# Patient Record
Sex: FEMALE | Race: WHITE | URBAN - METROPOLITAN AREA
[De-identification: names, ages, dates, MRNs, and addresses within clinical notes are randomized per-mention and may not be internally consistent; named-entity substitution may affect disease eponyms.]

---

## 2019-08-25 ENCOUNTER — EMERGENCY (EMERGENCY)
Facility: HOSPITAL | Age: 19
LOS: 1 days | Discharge: HOME | End: 2019-08-25
Attending: EMERGENCY MEDICINE
Payer: SELF-PAY

## 2019-08-25 VITALS
HEART RATE: 165 BPM | TEMPERATURE: 98 F | WEIGHT: 113.1 LBS | OXYGEN SATURATION: 100 % | SYSTOLIC BLOOD PRESSURE: 139 MMHG | DIASTOLIC BLOOD PRESSURE: 92 MMHG | RESPIRATION RATE: 22 BRPM | HEIGHT: 60 IN

## 2019-08-25 VITALS
DIASTOLIC BLOOD PRESSURE: 74 MMHG | SYSTOLIC BLOOD PRESSURE: 120 MMHG | RESPIRATION RATE: 18 BRPM | HEART RATE: 95 BPM | OXYGEN SATURATION: 98 % | TEMPERATURE: 98 F

## 2019-08-25 LAB
AMPHET UR-MCNC: NEGATIVE — SIGNIFICANT CHANGE UP
APPEARANCE UR: CLEAR — SIGNIFICANT CHANGE UP
APTT BLD: 28.1 SEC — SIGNIFICANT CHANGE UP (ref 27.5–36.3)
BARBITURATES UR SCN-MCNC: NEGATIVE — SIGNIFICANT CHANGE UP
BASOPHILS # BLD AUTO: 0.02 K/UL — SIGNIFICANT CHANGE UP (ref 0–0.2)
BASOPHILS NFR BLD AUTO: 0.3 % — SIGNIFICANT CHANGE UP (ref 0–2)
BENZODIAZ UR-MCNC: NEGATIVE — SIGNIFICANT CHANGE UP
BILIRUB UR-MCNC: NEGATIVE — SIGNIFICANT CHANGE UP
COCAINE METAB.OTHER UR-MCNC: NEGATIVE — SIGNIFICANT CHANGE UP
COLOR SPEC: YELLOW — SIGNIFICANT CHANGE UP
DIFF PNL FLD: NEGATIVE — SIGNIFICANT CHANGE UP
EOSINOPHIL # BLD AUTO: 0.02 K/UL — SIGNIFICANT CHANGE UP (ref 0–0.5)
EOSINOPHIL NFR BLD AUTO: 0.3 % — SIGNIFICANT CHANGE UP (ref 0–6)
EPI CELLS # UR: NEGATIVE — SIGNIFICANT CHANGE UP
GLUCOSE UR QL: NEGATIVE MG/DL — SIGNIFICANT CHANGE UP
HCT VFR BLD CALC: 43.5 % — SIGNIFICANT CHANGE UP (ref 34.5–45)
HGB BLD-MCNC: 14.5 G/DL — SIGNIFICANT CHANGE UP (ref 11.5–15.5)
IMM GRANULOCYTES NFR BLD AUTO: 0.4 % — SIGNIFICANT CHANGE UP (ref 0–1.5)
INR BLD: 0.93 RATIO — SIGNIFICANT CHANGE UP (ref 0.88–1.16)
KETONES UR-MCNC: ABNORMAL
LEUKOCYTE ESTERASE UR-ACNC: ABNORMAL
LYMPHOCYTES # BLD AUTO: 1.68 K/UL — SIGNIFICANT CHANGE UP (ref 1–3.3)
LYMPHOCYTES # BLD AUTO: 21.8 % — SIGNIFICANT CHANGE UP (ref 13–44)
MCHC RBC-ENTMCNC: 28.2 PG — SIGNIFICANT CHANGE UP (ref 27–34)
MCHC RBC-ENTMCNC: 33.3 GM/DL — SIGNIFICANT CHANGE UP (ref 32–36)
MCV RBC AUTO: 84.5 FL — SIGNIFICANT CHANGE UP (ref 80–100)
METHADONE UR-MCNC: NEGATIVE — SIGNIFICANT CHANGE UP
MONOCYTES # BLD AUTO: 0.41 K/UL — SIGNIFICANT CHANGE UP (ref 0–0.9)
MONOCYTES NFR BLD AUTO: 5.3 % — SIGNIFICANT CHANGE UP (ref 2–14)
NEUTROPHILS # BLD AUTO: 5.55 K/UL — SIGNIFICANT CHANGE UP (ref 1.8–7.4)
NEUTROPHILS NFR BLD AUTO: 71.9 % — SIGNIFICANT CHANGE UP (ref 43–77)
NITRITE UR-MCNC: NEGATIVE — SIGNIFICANT CHANGE UP
OPIATES UR-MCNC: NEGATIVE — SIGNIFICANT CHANGE UP
PCP SPEC-MCNC: SIGNIFICANT CHANGE UP
PCP UR-MCNC: NEGATIVE — SIGNIFICANT CHANGE UP
PH UR: 6 — SIGNIFICANT CHANGE UP (ref 5–8)
PLATELET # BLD AUTO: 288 K/UL — SIGNIFICANT CHANGE UP (ref 150–400)
PROT UR-MCNC: NEGATIVE MG/DL — SIGNIFICANT CHANGE UP
PROTHROM AB SERPL-ACNC: 10.7 SEC — SIGNIFICANT CHANGE UP (ref 10–12.9)
RBC # BLD: 5.15 M/UL — SIGNIFICANT CHANGE UP (ref 3.8–5.2)
RBC # FLD: 13.3 % — SIGNIFICANT CHANGE UP (ref 10.3–14.5)
RBC CASTS # UR COMP ASSIST: SIGNIFICANT CHANGE UP /HPF (ref 0–4)
SP GR SPEC: 1.01 — SIGNIFICANT CHANGE UP (ref 1.01–1.02)
THC UR QL: NEGATIVE — SIGNIFICANT CHANGE UP
UROBILINOGEN FLD QL: NEGATIVE MG/DL — SIGNIFICANT CHANGE UP
WBC # BLD: 7.71 K/UL — SIGNIFICANT CHANGE UP (ref 3.8–10.5)
WBC # FLD AUTO: 7.71 K/UL — SIGNIFICANT CHANGE UP (ref 3.8–10.5)
WBC UR QL: SIGNIFICANT CHANGE UP

## 2019-08-25 PROCEDURE — 72125 CT NECK SPINE W/O DYE: CPT | Mod: 26

## 2019-08-25 PROCEDURE — 99284 EMERGENCY DEPT VISIT MOD MDM: CPT

## 2019-08-25 PROCEDURE — 96375 TX/PRO/DX INJ NEW DRUG ADDON: CPT

## 2019-08-25 PROCEDURE — 99053 MED SERV 10PM-8AM 24 HR FAC: CPT

## 2019-08-25 PROCEDURE — 85027 COMPLETE CBC AUTOMATED: CPT

## 2019-08-25 PROCEDURE — 96361 HYDRATE IV INFUSION ADD-ON: CPT

## 2019-08-25 PROCEDURE — 96374 THER/PROPH/DIAG INJ IV PUSH: CPT

## 2019-08-25 PROCEDURE — 81001 URINALYSIS AUTO W/SCOPE: CPT

## 2019-08-25 PROCEDURE — 99284 EMERGENCY DEPT VISIT MOD MDM: CPT | Mod: 25

## 2019-08-25 PROCEDURE — 85730 THROMBOPLASTIN TIME PARTIAL: CPT

## 2019-08-25 PROCEDURE — 85610 PROTHROMBIN TIME: CPT

## 2019-08-25 PROCEDURE — 80307 DRUG TEST PRSMV CHEM ANLYZR: CPT

## 2019-08-25 PROCEDURE — 70450 CT HEAD/BRAIN W/O DYE: CPT | Mod: 26

## 2019-08-25 PROCEDURE — 36415 COLL VENOUS BLD VENIPUNCTURE: CPT

## 2019-08-25 PROCEDURE — 70450 CT HEAD/BRAIN W/O DYE: CPT

## 2019-08-25 PROCEDURE — 72125 CT NECK SPINE W/O DYE: CPT

## 2019-08-25 RX ORDER — KETOROLAC TROMETHAMINE 30 MG/ML
30 SYRINGE (ML) INJECTION ONCE
Refills: 0 | Status: DISCONTINUED | OUTPATIENT
Start: 2019-08-25 | End: 2019-08-25

## 2019-08-25 RX ORDER — SODIUM CHLORIDE 9 MG/ML
999 INJECTION INTRAMUSCULAR; INTRAVENOUS; SUBCUTANEOUS ONCE
Refills: 0 | Status: COMPLETED | OUTPATIENT
Start: 2019-08-25 | End: 2019-08-25

## 2019-08-25 RX ORDER — METOCLOPRAMIDE HCL 10 MG
10 TABLET ORAL ONCE
Refills: 0 | Status: COMPLETED | OUTPATIENT
Start: 2019-08-25 | End: 2019-08-25

## 2019-08-25 RX ADMIN — Medication 104 MILLIGRAM(S): at 06:23

## 2019-08-25 RX ADMIN — SODIUM CHLORIDE 999 MILLILITER(S): 9 INJECTION INTRAMUSCULAR; INTRAVENOUS; SUBCUTANEOUS at 03:43

## 2019-08-25 RX ADMIN — SODIUM CHLORIDE 999 MILLILITER(S): 9 INJECTION INTRAMUSCULAR; INTRAVENOUS; SUBCUTANEOUS at 06:11

## 2019-08-25 RX ADMIN — Medication 30 MILLIGRAM(S): at 06:23

## 2019-08-25 NOTE — ED ADULT NURSE NOTE - NSIMPLEMENTINTERV_GEN_ALL_ED
Implemented All Fall Risk Interventions:  [place] to call system. Call bell, personal items and telephone within reach. Instruct patient to call for assistance. Room bathroom lighting operational. Non-slip footwear when patient is off stretcher. Physically safe environment: no spills, clutter or unnecessary equipment. Stretcher in lowest position, wheels locked, appropriate side rails in place. Provide visual cue, wrist band, yellow gown, etc. Monitor gait and stability. Monitor for mental status changes and reorient to person, place, and time. Review medications for side effects contributing to fall risk. Reinforce activity limits and safety measures with patient and family.

## 2019-08-25 NOTE — ED ADULT NURSE NOTE - OBJECTIVE STATEMENT
Patient is a 19 year old female, A&Ox4, presents upsets. c/o fall. Patient states she tripped over a rock and hit the back of her head. Denies LOC. Hematoma and laceration present to back of head. Bleeding under control. Scrap to left pinky toe and knee cap. Patient complaining of pain 7/10 to the back of the head. Patient denies drinking alcohol or using illicit drugs. Patient placed on cardiac monitor. Sinus tachycardia. Respirations even, spontaneous, and unlabored. Patient priority CT.

## 2019-08-25 NOTE — ED PROVIDER NOTE - PHYSICAL EXAMINATION
sitting comfortably, talking in full sentences  pupils reactive, eomi,   mm moist, no oral injury, no facial pain  supple, no c/t/l spine tenderness, from, trachea in midline  Louis chest expansion, no cheat wall tenderness, no rib tenderness, no deformity, no clavicular pain  S1 S2 distant  abd soft, non tender, no g/r,   no pelvic pain  moves all ext, no swelling noted  Neuro aao3, cn intact grossly, no focal deficits  skin no bruises, no swelling sitting comfortably, talking in full sentences  pupils reactive, eomi,   mm moist, no oral injury, no facial pain  supple, no c/t/l spine tenderness, from, trachea in midline  Louis chest expansion, no cheat wall tenderness, no rib tenderness, no deformity, no clavicular pain  S1 S2 distant  abd soft, non tender, no g/r,   no pelvic pain  moves all ext, no swelling noted  Neuro aao3, cn intact grossly, no focal deficits  skin no bruises, no swelling, posterior scalp contusion, no laceration noted

## 2019-08-25 NOTE — ED PROVIDER NOTE - NS ED ROS FT
no weight change, no fever or chills  + scalp laceration, no rash, no bruises  no visual changes no eye discharge  no cough cold or congestion,   no sob, no chest pain  no orthopnea, no pnd  no abd pain, no n/v/d  no hematuria, no change in urinary habits  no joint pain, no deformity  no headache, no paresthesia

## 2019-08-25 NOTE — ED ADULT NURSE NOTE - CHIEF COMPLAINT QUOTE
patient with boyfriend states that she fell hitting back on head sustaining a laceration to back of head denies LOC, patient is alert and oriented x 4 MAGANA x 4, patient upset, spoke with MD patient brought to CC for evaluation and priority CT

## 2019-08-25 NOTE — ED PROVIDER NOTE - OBJECTIVE STATEMENT
Pt is a 20 y/o F, with hx of heart murmur, presenting to the ED s/p mechanical trip and fall. Pt reports tripping on a rock, falling backwards, and striking her left scalp against the rock. No LOC. Was quickly tended to by her boyfriend who picked the pt up from the ground. She presents with left posterior scalp laceration. Noted dried blood but bleeding well controlled. She admits to some pain over the laceration. Denies fever, N/V, abd pain, CP, SOB, dizziness, HA, and any other injuries.   Pt denies drinking alcohol today, prior to fall. She believes she was staying well hydrated. Denies illicit drug use. Denies chance of pregnancy. Unknown last tetanus. Other than heart murmur, denies any PMHx. No daily medications. NKDA.

## 2019-08-25 NOTE — ED ADULT TRIAGE NOTE - CHIEF COMPLAINT QUOTE
patient with boyfriend states that she fell hitting back on head sustaining a laceration to back of head denies LOC, patient is alert and oriented x 4 MAGANA x 4, patient upset patient with boyfriend states that she fell hitting back on head sustaining a laceration to back of head denies LOC, patient is alert and oriented x 4 MAGANA x 4, patient upset, spoke with MD patient brought to CC for evaluation and priority CT

## 2019-08-25 NOTE — ED PROVIDER NOTE - CLINICAL SUMMARY MEDICAL DECISION MAKING FREE TEXT BOX
18 yo F s/p fall. Appears anxiois, Check labs, CT head. No scalp laceration noted. Plan to clean, wash, and reassess.

## 2021-07-01 PROBLEM — R01.1 CARDIAC MURMUR, UNSPECIFIED: Chronic | Status: ACTIVE | Noted: 2019-09-05

## 2021-07-19 ENCOUNTER — APPOINTMENT (OUTPATIENT)
Dept: OBGYN | Facility: CLINIC | Age: 21
End: 2021-07-19
Payer: OTHER GOVERNMENT

## 2021-07-19 PROCEDURE — 76830 TRANSVAGINAL US NON-OB: CPT

## 2021-07-19 PROCEDURE — 99203 OFFICE O/P NEW LOW 30 MIN: CPT | Mod: 25

## 2021-07-19 RX ORDER — SULFAMETHOXAZOLE AND TRIMETHOPRIM 800; 160 MG/1; MG/1
800-160 TABLET ORAL
Qty: 14 | Refills: 0 | Status: DISCONTINUED | COMMUNITY
Start: 2021-06-04

## 2021-07-19 NOTE — HISTORY OF PRESENT ILLNESS
[FreeTextEntry1] : Patient is a 21-year-old female who presents with complaints of amenorrhea. Last menstrual period was May 2 and had a positive home pregnancy test. This would make the patient approximately 11 weeks pregnant by dates.

## 2021-07-19 NOTE — PROCEDURE
[Transvaginal OB Sonogram] : Transvaginal OB Sonogram [Intrauterine Pregnancy] : intrauterine pregnancy [Yolk Sac] : yolk sac present [Fetal Heart] : fetal heart present [CRL: ___ (mm)] : CRL - [unfilled]Umm [Current GA by Sonogram: ___ (wks)] : Current GA by Sonogram: [unfilled]Uwks [___ day(s)] : [unfilled] days [Transvaginal OB Sonogram WNL] : Transvaginal OB Sonogram WNL [FreeTextEntry1] : Transvaginal OB ultrasound performed: A viable jackson H. uterine gestation seen, fetal heart motion was noted, crown-rump length is consistent with 9 weeks and 4 days gestation.

## 2021-07-28 ENCOUNTER — NON-APPOINTMENT (OUTPATIENT)
Age: 21
End: 2021-07-28

## 2021-07-29 ENCOUNTER — NON-APPOINTMENT (OUTPATIENT)
Age: 21
End: 2021-07-29

## 2021-08-02 ENCOUNTER — APPOINTMENT (OUTPATIENT)
Dept: OBGYN | Facility: CLINIC | Age: 21
End: 2021-08-02
Payer: OTHER GOVERNMENT

## 2021-08-02 VITALS
HEART RATE: 70 BPM | DIASTOLIC BLOOD PRESSURE: 68 MMHG | OXYGEN SATURATION: 98 % | RESPIRATION RATE: 16 BRPM | TEMPERATURE: 97.6 F | SYSTOLIC BLOOD PRESSURE: 108 MMHG

## 2021-08-02 DIAGNOSIS — Z12.4 ENCOUNTER FOR SCREENING FOR MALIGNANT NEOPLASM OF CERVIX: ICD-10-CM

## 2021-08-02 DIAGNOSIS — Z11.3 ENCOUNTER FOR SCREENING FOR INFECTIONS WITH A PREDOMINANTLY SEXUAL MODE OF TRANSMISSION: ICD-10-CM

## 2021-08-02 PROCEDURE — 0501F PRENATAL FLOW SHEET: CPT

## 2021-08-02 PROCEDURE — 76830 TRANSVAGINAL US NON-OB: CPT

## 2021-08-02 PROCEDURE — 81003 URINALYSIS AUTO W/O SCOPE: CPT | Mod: QW

## 2021-08-02 PROCEDURE — 36415 COLL VENOUS BLD VENIPUNCTURE: CPT

## 2021-08-02 NOTE — PROCEDURE
[Intrauterine Pregnancy] : intrauterine pregnancy [Yolk Sac] : yolk sac present [Fetal Heart] : fetal heart present [CRL: ___ (mm)] : CRL - [unfilled]Umm [Date: ___] : EDC: [unfilled] [Current GA by Sonogram: ___ (wks)] : Current GA by Sonogram: [unfilled]Uwks [___ day(s)] : [unfilled] days [WNL] : Transvaginal OB Sonogram WNL [FreeTextEntry1] : Transvaginal OB ultrasound performed: A viable jackson intrauterine gestation is seen, fetal heart motion is noted, fetal movement is noted, crown-rump length was consistent with 11 weeks and 5 days gestation

## 2021-08-03 ENCOUNTER — LABORATORY RESULT (OUTPATIENT)
Age: 21
End: 2021-08-03

## 2021-08-03 LAB
ABO + RH PNL BLD: NORMAL
APPEARANCE: CLEAR
BACTERIA: NEGATIVE
BASOPHILS # BLD AUTO: 0.02 K/UL
BASOPHILS NFR BLD AUTO: 0.3 %
BILIRUBIN URINE: NEGATIVE
BLD GP AB SCN SERPL QL: NORMAL
BLOOD URINE: NEGATIVE
C TRACH RRNA SPEC QL NAA+PROBE: NOT DETECTED
CMV IGG SERPL QL: 3.2 U/ML
CMV IGG SERPL-IMP: POSITIVE
CMV IGM SERPL QL: <8 AU/ML
CMV IGM SERPL QL: NEGATIVE
COLOR: YELLOW
EOSINOPHIL # BLD AUTO: 0.15 K/UL
EOSINOPHIL NFR BLD AUTO: 1.9 %
GLUCOSE QUALITATIVE U: NEGATIVE
HBV SURFACE AG SER QL: NONREACTIVE
HCT VFR BLD CALC: 38.9 %
HCV AB SER QL: NONREACTIVE
HCV S/CO RATIO: 0.13 S/CO
HGB BLD-MCNC: 12.8 G/DL
HIV1+2 AB SPEC QL IA.RAPID: NONREACTIVE
HYALINE CASTS: 1 /LPF
IMM GRANULOCYTES NFR BLD AUTO: 0.3 %
KETONES URINE: NEGATIVE
LEUKOCYTE ESTERASE URINE: ABNORMAL
LYMPHOCYTES # BLD AUTO: 1.44 K/UL
LYMPHOCYTES NFR BLD AUTO: 18.5 %
MAN DIFF?: NORMAL
MCHC RBC-ENTMCNC: 28.1 PG
MCHC RBC-ENTMCNC: 32.9 GM/DL
MCV RBC AUTO: 85.5 FL
MEV IGG FLD QL IA: >300 AU/ML
MEV IGG+IGM SER-IMP: POSITIVE
MICROSCOPIC-UA: NORMAL
MONOCYTES # BLD AUTO: 0.39 K/UL
MONOCYTES NFR BLD AUTO: 5 %
N GONORRHOEA RRNA SPEC QL NAA+PROBE: NOT DETECTED
NEUTROPHILS # BLD AUTO: 5.76 K/UL
NEUTROPHILS NFR BLD AUTO: 74 %
NITRITE URINE: NEGATIVE
PH URINE: 6
PLATELET # BLD AUTO: 269 K/UL
PROGEST SERPL-MCNC: 20.6 NG/ML
PROTEIN URINE: NORMAL
RBC # BLD: 4.55 M/UL
RBC # FLD: 13.2 %
RED BLOOD CELLS URINE: 3 /HPF
RUBV IGG FLD-ACNC: 3.1 INDEX
RUBV IGG SER-IMP: POSITIVE
SOURCE TP AMPLIFICATION: NORMAL
SPECIFIC GRAVITY URINE: 1.02
SQUAMOUS EPITHELIAL CELLS: 2 /HPF
T GONDII AB SER-IMP: NEGATIVE
T GONDII AB SER-IMP: NEGATIVE
T GONDII IGG SER QL: <3 IU/ML
T GONDII IGM SER QL: <3 AU/ML
T PALLIDUM AB SER QL IA: NEGATIVE
T3FREE SERPL-MCNC: 4.77 PG/ML
T4 FREE SERPL-MCNC: 1.2 NG/DL
TSH SERPL-ACNC: 1.87 UIU/ML
UROBILINOGEN URINE: NORMAL
VZV AB TITR SER: POSITIVE
VZV IGG SER IF-ACNC: 959.7 INDEX
WBC # FLD AUTO: 7.78 K/UL
WHITE BLOOD CELLS URINE: 2 /HPF

## 2021-08-04 LAB
BACTERIA UR CULT: ABNORMAL
CANDIDA VAG CYTO: NOT DETECTED
G VAGINALIS+PREV SP MTYP VAG QL MICRO: DETECTED
HGB A MFR BLD: 54.3 %
HGB A2 MFR BLD: 2.8 %
HGB FRACT BLD-IMP: NORMAL
HGB S BLD QL SOLY: POSITIVE
HGB S MFR BLD: 42.9 %
T VAGINALIS VAG QL WET PREP: NOT DETECTED

## 2021-08-05 ENCOUNTER — NON-APPOINTMENT (OUTPATIENT)
Age: 21
End: 2021-08-05

## 2021-08-06 LAB
B19V IGG SER QL IA: 3.58 INDEX
B19V IGG+IGM SER-IMP: NORMAL
B19V IGG+IGM SER-IMP: POSITIVE
B19V IGM FLD-ACNC: 0.18 INDEX
B19V IGM SER-ACNC: NEGATIVE

## 2021-08-11 LAB — CFTR MUT TESTED BLD/T: NEGATIVE

## 2021-08-12 ENCOUNTER — APPOINTMENT (OUTPATIENT)
Dept: ANTEPARTUM | Facility: CLINIC | Age: 21
End: 2021-08-12
Payer: OTHER GOVERNMENT

## 2021-08-12 ENCOUNTER — ASOB RESULT (OUTPATIENT)
Age: 21
End: 2021-08-12

## 2021-08-12 VITALS
WEIGHT: 135 LBS | HEIGHT: 61 IN | DIASTOLIC BLOOD PRESSURE: 68 MMHG | BODY MASS INDEX: 25.49 KG/M2 | SYSTOLIC BLOOD PRESSURE: 106 MMHG

## 2021-08-12 PROCEDURE — 76813 OB US NUCHAL MEAS 1 GEST: CPT

## 2021-08-16 ENCOUNTER — ASOB RESULT (OUTPATIENT)
Age: 21
End: 2021-08-16

## 2021-08-16 ENCOUNTER — APPOINTMENT (OUTPATIENT)
Dept: MATERNAL FETAL MEDICINE | Facility: CLINIC | Age: 21
End: 2021-08-16
Payer: OTHER GOVERNMENT

## 2021-08-16 LAB
ADDENDUM DOC: NORMAL
AFP PNL SERPL: NORMAL
AFP SERPL-ACNC: NORMAL
CLINICAL BIOCHEMIST REVIEW: NORMAL
FREE BETA HCG 1ST TRIMESTER: NORMAL
INHIBIN-A 1ST TRIMESTER: NORMAL
Lab: NORMAL
NOTES NTD: NORMAL
NT: NORMAL
PAPP-A SERPL-ACNC: NORMAL
PIGF SER-MCNC: NORMAL
TRISOMY 18/3: NORMAL

## 2021-08-16 PROCEDURE — 99441: CPT

## 2021-08-19 ENCOUNTER — NON-APPOINTMENT (OUTPATIENT)
Age: 21
End: 2021-08-19

## 2021-08-27 ENCOUNTER — NON-APPOINTMENT (OUTPATIENT)
Age: 21
End: 2021-08-27

## 2021-08-30 ENCOUNTER — APPOINTMENT (OUTPATIENT)
Dept: OBGYN | Facility: CLINIC | Age: 21
End: 2021-08-30
Payer: OTHER GOVERNMENT

## 2021-08-30 DIAGNOSIS — Z34.92 ENCOUNTER FOR SUPERVISION OF NORMAL PREGNANCY, UNSPECIFIED, SECOND TRIMESTER: ICD-10-CM

## 2021-08-30 LAB
BILIRUB UR QL STRIP: NORMAL
GLUCOSE UR-MCNC: NORMAL
HCG UR QL: 0.2 EU/DL
HGB UR QL STRIP.AUTO: NORMAL
KETONES UR-MCNC: NORMAL
LEUKOCYTE ESTERASE UR QL STRIP: NORMAL
NITRITE UR QL STRIP: NORMAL
PH UR STRIP: 6
PROT UR STRIP-MCNC: NORMAL
SP GR UR STRIP: 1.02

## 2021-08-30 PROCEDURE — 81003 URINALYSIS AUTO W/O SCOPE: CPT | Mod: QW

## 2021-08-30 PROCEDURE — 36415 COLL VENOUS BLD VENIPUNCTURE: CPT

## 2021-08-30 PROCEDURE — 0502F SUBSEQUENT PRENATAL CARE: CPT

## 2021-09-02 LAB
1ST TRIMESTER DATA: NORMAL
2ND TRIMESTER DATA: NORMAL
AFP PNL SERPL: NORMAL
AFP SERPL-ACNC: NORMAL
AFP SERPL-ACNC: NORMAL
B-HCG FREE SERPL-MCNC: NORMAL
CLINICAL BIOCHEMIST REVIEW: NORMAL
FREE BETA HCG 1ST TRIMESTER: NORMAL
INHIBIN A SERPL-MCNC: NORMAL
INHIBIN-A 1ST TRIMESTER: NORMAL
NOTES NTD: NORMAL
NT: NORMAL
PAPP-A SERPL-ACNC: NORMAL
PIGF SER-MCNC: NORMAL
U ESTRIOL SERPL-SCNC: NORMAL

## 2021-09-21 ENCOUNTER — NON-APPOINTMENT (OUTPATIENT)
Age: 21
End: 2021-09-21

## 2021-09-22 ENCOUNTER — APPOINTMENT (OUTPATIENT)
Dept: OBGYN | Facility: CLINIC | Age: 21
End: 2021-09-22
Payer: OTHER GOVERNMENT

## 2021-09-22 VITALS
HEIGHT: 61 IN | DIASTOLIC BLOOD PRESSURE: 66 MMHG | WEIGHT: 137 LBS | SYSTOLIC BLOOD PRESSURE: 106 MMHG | RESPIRATION RATE: 14 BRPM | HEART RATE: 16 BPM | TEMPERATURE: 97.2 F | BODY MASS INDEX: 25.86 KG/M2

## 2021-09-22 LAB
BILIRUB UR QL STRIP: NEGATIVE
CLARITY UR: NORMAL
COLLECTION METHOD: NORMAL
GLUCOSE UR-MCNC: NEGATIVE
HCG UR QL: 0.2 EU/DL
HGB UR QL STRIP.AUTO: NEGATIVE
KETONES UR-MCNC: NEGATIVE
LEUKOCYTE ESTERASE UR QL STRIP: NORMAL
NITRITE UR QL STRIP: NEGATIVE
PH UR STRIP: 7
PROT UR STRIP-MCNC: NEGATIVE
SP GR UR STRIP: 1.02

## 2021-09-22 PROCEDURE — 81003 URINALYSIS AUTO W/O SCOPE: CPT | Mod: QW

## 2021-09-22 PROCEDURE — 0502F SUBSEQUENT PRENATAL CARE: CPT

## 2021-09-23 ENCOUNTER — NON-APPOINTMENT (OUTPATIENT)
Age: 21
End: 2021-09-23

## 2021-09-23 LAB
CANDIDA VAG CYTO: DETECTED
G VAGINALIS+PREV SP MTYP VAG QL MICRO: DETECTED
T VAGINALIS VAG QL WET PREP: NOT DETECTED

## 2021-09-29 ENCOUNTER — APPOINTMENT (OUTPATIENT)
Dept: OBGYN | Facility: CLINIC | Age: 21
End: 2021-09-29
Payer: OTHER GOVERNMENT

## 2021-09-29 VITALS
HEIGHT: 61 IN | WEIGHT: 137 LBS | DIASTOLIC BLOOD PRESSURE: 70 MMHG | SYSTOLIC BLOOD PRESSURE: 110 MMHG | BODY MASS INDEX: 25.86 KG/M2

## 2021-09-29 PROCEDURE — 0502F SUBSEQUENT PRENATAL CARE: CPT

## 2021-09-30 ENCOUNTER — ASOB RESULT (OUTPATIENT)
Age: 21
End: 2021-09-30

## 2021-09-30 ENCOUNTER — APPOINTMENT (OUTPATIENT)
Dept: ANTEPARTUM | Facility: CLINIC | Age: 21
End: 2021-09-30
Payer: OTHER GOVERNMENT

## 2021-09-30 PROCEDURE — 76805 OB US >/= 14 WKS SNGL FETUS: CPT

## 2021-10-20 ENCOUNTER — NON-APPOINTMENT (OUTPATIENT)
Age: 21
End: 2021-10-20

## 2021-10-25 ENCOUNTER — APPOINTMENT (OUTPATIENT)
Dept: OBGYN | Facility: CLINIC | Age: 21
End: 2021-10-25
Payer: OTHER GOVERNMENT

## 2021-10-25 VITALS
WEIGHT: 138 LBS | DIASTOLIC BLOOD PRESSURE: 60 MMHG | SYSTOLIC BLOOD PRESSURE: 103 MMHG | BODY MASS INDEX: 26.06 KG/M2 | HEIGHT: 61 IN

## 2021-10-25 LAB
BILIRUB UR QL STRIP: NORMAL
GLUCOSE UR-MCNC: NORMAL
HCG UR QL: 0.2 EU/DL
HGB UR QL STRIP.AUTO: NORMAL
KETONES UR-MCNC: NORMAL
LEUKOCYTE ESTERASE UR QL STRIP: NORMAL
NITRITE UR QL STRIP: NORMAL
PH UR STRIP: 6.5
PROT UR STRIP-MCNC: NORMAL
SP GR UR STRIP: 1.02

## 2021-10-25 PROCEDURE — 90471 IMMUNIZATION ADMIN: CPT

## 2021-10-25 PROCEDURE — 90686 IIV4 VACC NO PRSV 0.5 ML IM: CPT

## 2021-10-25 PROCEDURE — 81003 URINALYSIS AUTO W/O SCOPE: CPT | Mod: QW

## 2021-10-25 PROCEDURE — 0502F SUBSEQUENT PRENATAL CARE: CPT

## 2021-10-28 ENCOUNTER — ASOB RESULT (OUTPATIENT)
Age: 21
End: 2021-10-28

## 2021-10-28 ENCOUNTER — APPOINTMENT (OUTPATIENT)
Dept: ANTEPARTUM | Facility: CLINIC | Age: 21
End: 2021-10-28
Payer: OTHER GOVERNMENT

## 2021-10-28 PROCEDURE — 76816 OB US FOLLOW-UP PER FETUS: CPT

## 2021-11-05 ENCOUNTER — NON-APPOINTMENT (OUTPATIENT)
Age: 21
End: 2021-11-05

## 2021-11-08 ENCOUNTER — APPOINTMENT (OUTPATIENT)
Dept: OBGYN | Facility: CLINIC | Age: 21
End: 2021-11-08
Payer: OTHER GOVERNMENT

## 2021-11-08 VITALS
BODY MASS INDEX: 27.38 KG/M2 | HEIGHT: 61 IN | DIASTOLIC BLOOD PRESSURE: 70 MMHG | SYSTOLIC BLOOD PRESSURE: 110 MMHG | WEIGHT: 145 LBS

## 2021-11-08 PROCEDURE — 36415 COLL VENOUS BLD VENIPUNCTURE: CPT

## 2021-11-08 PROCEDURE — 0502F SUBSEQUENT PRENATAL CARE: CPT

## 2021-11-08 PROCEDURE — 81003 URINALYSIS AUTO W/O SCOPE: CPT | Mod: QW

## 2021-11-09 ENCOUNTER — NON-APPOINTMENT (OUTPATIENT)
Age: 21
End: 2021-11-09

## 2021-11-09 LAB
BASOPHILS # BLD AUTO: 0.02 K/UL
BASOPHILS NFR BLD AUTO: 0.3 %
COVID-19 NUCLEOCAPSID  GAM ANTIBODY INTERPRETATION: NEGATIVE
EOSINOPHIL # BLD AUTO: 0.1 K/UL
EOSINOPHIL NFR BLD AUTO: 1.3 %
GLUCOSE 1H P 50 G GLC PO SERPL-MCNC: 87 MG/DL
HCT VFR BLD CALC: 33.9 %
HGB BLD-MCNC: 10.7 G/DL
IMM GRANULOCYTES NFR BLD AUTO: 0.3 %
LYMPHOCYTES # BLD AUTO: 1.27 K/UL
LYMPHOCYTES NFR BLD AUTO: 16 %
MAN DIFF?: NORMAL
MCHC RBC-ENTMCNC: 27.7 PG
MCHC RBC-ENTMCNC: 31.6 GM/DL
MCV RBC AUTO: 87.8 FL
MONOCYTES # BLD AUTO: 0.43 K/UL
MONOCYTES NFR BLD AUTO: 5.4 %
NEUTROPHILS # BLD AUTO: 6.12 K/UL
NEUTROPHILS NFR BLD AUTO: 76.7 %
PLATELET # BLD AUTO: 257 K/UL
RBC # BLD: 3.86 M/UL
RBC # FLD: 13.5 %
SARS-COV-2 AB SERPL QL IA: 0.1 INDEX
WBC # FLD AUTO: 7.96 K/UL

## 2021-11-10 ENCOUNTER — NON-APPOINTMENT (OUTPATIENT)
Age: 21
End: 2021-11-10

## 2021-11-10 LAB
CANDIDA VAG CYTO: NOT DETECTED
G VAGINALIS+PREV SP MTYP VAG QL MICRO: DETECTED
T VAGINALIS VAG QL WET PREP: NOT DETECTED

## 2021-11-19 ENCOUNTER — NON-APPOINTMENT (OUTPATIENT)
Age: 21
End: 2021-11-19

## 2021-11-22 ENCOUNTER — TRANSCRIPTION ENCOUNTER (OUTPATIENT)
Age: 21
End: 2021-11-22

## 2021-11-22 ENCOUNTER — APPOINTMENT (OUTPATIENT)
Dept: OBGYN | Facility: CLINIC | Age: 21
End: 2021-11-22
Payer: OTHER GOVERNMENT

## 2021-11-22 ENCOUNTER — RESULT CHARGE (OUTPATIENT)
Age: 21
End: 2021-11-22

## 2021-11-22 VITALS — DIASTOLIC BLOOD PRESSURE: 80 MMHG | WEIGHT: 146 LBS | SYSTOLIC BLOOD PRESSURE: 110 MMHG

## 2021-11-22 PROCEDURE — 0502F SUBSEQUENT PRENATAL CARE: CPT

## 2021-12-02 ENCOUNTER — ASOB RESULT (OUTPATIENT)
Age: 21
End: 2021-12-02

## 2021-12-02 ENCOUNTER — APPOINTMENT (OUTPATIENT)
Dept: ANTEPARTUM | Facility: CLINIC | Age: 21
End: 2021-12-02
Payer: OTHER GOVERNMENT

## 2021-12-02 PROCEDURE — 76816 OB US FOLLOW-UP PER FETUS: CPT

## 2021-12-03 ENCOUNTER — NON-APPOINTMENT (OUTPATIENT)
Age: 21
End: 2021-12-03

## 2021-12-06 ENCOUNTER — APPOINTMENT (OUTPATIENT)
Dept: OBGYN | Facility: CLINIC | Age: 21
End: 2021-12-06
Payer: OTHER GOVERNMENT

## 2021-12-06 VITALS
HEIGHT: 61 IN | DIASTOLIC BLOOD PRESSURE: 60 MMHG | SYSTOLIC BLOOD PRESSURE: 108 MMHG | WEIGHT: 145 LBS | BODY MASS INDEX: 27.38 KG/M2

## 2021-12-06 PROCEDURE — 0502F SUBSEQUENT PRENATAL CARE: CPT

## 2022-01-05 ENCOUNTER — APPOINTMENT (OUTPATIENT)
Dept: OBGYN | Facility: CLINIC | Age: 22
End: 2022-01-05
Payer: OTHER GOVERNMENT

## 2022-01-05 VITALS
BODY MASS INDEX: 27.38 KG/M2 | HEIGHT: 61 IN | SYSTOLIC BLOOD PRESSURE: 112 MMHG | DIASTOLIC BLOOD PRESSURE: 62 MMHG | WEIGHT: 145 LBS

## 2022-01-05 PROCEDURE — 0502F SUBSEQUENT PRENATAL CARE: CPT

## 2022-01-06 ENCOUNTER — MED ADMIN CHARGE (OUTPATIENT)
Age: 22
End: 2022-01-06

## 2022-01-18 ENCOUNTER — NON-APPOINTMENT (OUTPATIENT)
Age: 22
End: 2022-01-18

## 2022-01-19 ENCOUNTER — APPOINTMENT (OUTPATIENT)
Dept: OBGYN | Facility: CLINIC | Age: 22
End: 2022-01-19
Payer: OTHER GOVERNMENT

## 2022-01-19 VITALS — SYSTOLIC BLOOD PRESSURE: 110 MMHG | WEIGHT: 146 LBS | DIASTOLIC BLOOD PRESSURE: 80 MMHG

## 2022-01-19 PROCEDURE — 36415 COLL VENOUS BLD VENIPUNCTURE: CPT

## 2022-01-19 PROCEDURE — 0502F SUBSEQUENT PRENATAL CARE: CPT

## 2022-01-19 PROCEDURE — 81003 URINALYSIS AUTO W/O SCOPE: CPT | Mod: QW

## 2022-01-20 LAB
BASOPHILS # BLD AUTO: 0.02 K/UL
BASOPHILS NFR BLD AUTO: 0.3 %
EOSINOPHIL # BLD AUTO: 0.11 K/UL
EOSINOPHIL NFR BLD AUTO: 1.4 %
HCT VFR BLD CALC: 34.2 %
HGB BLD-MCNC: 10.7 G/DL
HIV1+2 AB SPEC QL IA.RAPID: NONREACTIVE
IMM GRANULOCYTES NFR BLD AUTO: 0.3 %
LYMPHOCYTES # BLD AUTO: 1.66 K/UL
LYMPHOCYTES NFR BLD AUTO: 21 %
MAN DIFF?: NORMAL
MCHC RBC-ENTMCNC: 26.1 PG
MCHC RBC-ENTMCNC: 31.3 GM/DL
MCV RBC AUTO: 83.4 FL
MONOCYTES # BLD AUTO: 0.38 K/UL
MONOCYTES NFR BLD AUTO: 4.8 %
NEUTROPHILS # BLD AUTO: 5.71 K/UL
NEUTROPHILS NFR BLD AUTO: 72.2 %
PLATELET # BLD AUTO: 255 K/UL
RBC # BLD: 4.1 M/UL
RBC # FLD: 14.9 %
WBC # FLD AUTO: 7.9 K/UL

## 2022-01-21 ENCOUNTER — NON-APPOINTMENT (OUTPATIENT)
Age: 22
End: 2022-01-21

## 2022-01-21 LAB
GP B STREP DNA SPEC QL NAA+PROBE: NORMAL
GP B STREP DNA SPEC QL NAA+PROBE: NOT DETECTED
SOURCE GBS: NORMAL

## 2022-01-24 ENCOUNTER — APPOINTMENT (OUTPATIENT)
Dept: OBGYN | Facility: CLINIC | Age: 22
End: 2022-01-24
Payer: OTHER GOVERNMENT

## 2022-01-24 VITALS
WEIGHT: 147 LBS | SYSTOLIC BLOOD PRESSURE: 104 MMHG | DIASTOLIC BLOOD PRESSURE: 62 MMHG | HEIGHT: 64 IN | BODY MASS INDEX: 25.1 KG/M2

## 2022-01-24 PROCEDURE — 0502F SUBSEQUENT PRENATAL CARE: CPT

## 2022-01-25 ENCOUNTER — ASOB RESULT (OUTPATIENT)
Age: 22
End: 2022-01-25

## 2022-01-25 ENCOUNTER — APPOINTMENT (OUTPATIENT)
Dept: ANTEPARTUM | Facility: CLINIC | Age: 22
End: 2022-01-25
Payer: OTHER GOVERNMENT

## 2022-01-25 PROCEDURE — 76816 OB US FOLLOW-UP PER FETUS: CPT

## 2022-01-26 ENCOUNTER — NON-APPOINTMENT (OUTPATIENT)
Age: 22
End: 2022-01-26

## 2022-01-31 ENCOUNTER — APPOINTMENT (OUTPATIENT)
Dept: OBGYN | Facility: CLINIC | Age: 22
End: 2022-01-31
Payer: OTHER GOVERNMENT

## 2022-01-31 VITALS — WEIGHT: 150 LBS | SYSTOLIC BLOOD PRESSURE: 110 MMHG | DIASTOLIC BLOOD PRESSURE: 80 MMHG

## 2022-01-31 PROCEDURE — 0502F SUBSEQUENT PRENATAL CARE: CPT

## 2022-02-07 ENCOUNTER — APPOINTMENT (OUTPATIENT)
Dept: OBGYN | Facility: CLINIC | Age: 22
End: 2022-02-07
Payer: OTHER GOVERNMENT

## 2022-02-07 VITALS
DIASTOLIC BLOOD PRESSURE: 70 MMHG | TEMPERATURE: 97.2 F | RESPIRATION RATE: 16 BRPM | WEIGHT: 149 LBS | SYSTOLIC BLOOD PRESSURE: 108 MMHG | HEART RATE: 16 BPM

## 2022-02-07 VITALS
BODY MASS INDEX: 25.44 KG/M2 | SYSTOLIC BLOOD PRESSURE: 108 MMHG | HEIGHT: 64 IN | DIASTOLIC BLOOD PRESSURE: 70 MMHG | HEART RATE: 72 BPM | TEMPERATURE: 97.4 F | RESPIRATION RATE: 16 BRPM | WEIGHT: 149 LBS

## 2022-02-07 PROCEDURE — 0502F SUBSEQUENT PRENATAL CARE: CPT

## 2022-02-09 ENCOUNTER — TRANSCRIPTION ENCOUNTER (OUTPATIENT)
Age: 22
End: 2022-02-09

## 2022-02-14 ENCOUNTER — APPOINTMENT (OUTPATIENT)
Dept: OBGYN | Facility: CLINIC | Age: 22
End: 2022-02-14
Payer: OTHER GOVERNMENT

## 2022-02-14 VITALS
BODY MASS INDEX: 25.61 KG/M2 | WEIGHT: 150 LBS | DIASTOLIC BLOOD PRESSURE: 70 MMHG | SYSTOLIC BLOOD PRESSURE: 120 MMHG | HEIGHT: 64 IN

## 2022-02-14 PROCEDURE — 59025 FETAL NON-STRESS TEST: CPT

## 2022-02-14 PROCEDURE — 59426 ANTEPARTUM CARE ONLY: CPT

## 2022-02-14 PROCEDURE — 0502F SUBSEQUENT PRENATAL CARE: CPT | Mod: 25

## 2022-02-16 ENCOUNTER — NON-APPOINTMENT (OUTPATIENT)
Age: 22
End: 2022-02-16

## 2022-02-16 ENCOUNTER — APPOINTMENT (OUTPATIENT)
Dept: ANTEPARTUM | Facility: CLINIC | Age: 22
End: 2022-02-16
Payer: OTHER GOVERNMENT

## 2022-02-16 ENCOUNTER — ASOB RESULT (OUTPATIENT)
Age: 22
End: 2022-02-16

## 2022-02-16 PROCEDURE — 76819 FETAL BIOPHYS PROFIL W/O NST: CPT

## 2022-02-17 ENCOUNTER — INPATIENT (INPATIENT)
Facility: HOSPITAL | Age: 22
LOS: 1 days | Discharge: HOME | End: 2022-02-19
Attending: FAMILY MEDICINE | Admitting: OBSTETRICS & GYNECOLOGY
Payer: OTHER GOVERNMENT

## 2022-02-17 VITALS — HEIGHT: 61 IN | WEIGHT: 149.91 LBS
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
## 2022-02-17 DIAGNOSIS — O26.899 OTHER SPECIFIED PREGNANCY RELATED CONDITIONS, UNSPECIFIED TRIMESTER: ICD-10-CM
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
## 2022-02-17 LAB
ALBUMIN SERPL ELPH-MCNC: 2.4 G/DL — LOW (ref 3.3–5)
ALP SERPL-CCNC: 234 U/L — HIGH (ref 40–120)
ALT FLD-CCNC: 15 U/L — SIGNIFICANT CHANGE UP (ref 12–78)
ANION GAP SERPL CALC-SCNC: 8 MMOL/L — SIGNIFICANT CHANGE UP (ref 5–17)
APTT BLD: 25.7 SEC — LOW (ref 27.5–35.5)
AST SERPL-CCNC: 28 U/L — SIGNIFICANT CHANGE UP (ref 15–37)
BILIRUB SERPL-MCNC: 0.5 MG/DL — SIGNIFICANT CHANGE UP (ref 0.2–1.2)
BUN SERPL-MCNC: 7 MG/DL — SIGNIFICANT CHANGE UP (ref 7–23)
CALCIUM SERPL-MCNC: 8.7 MG/DL — SIGNIFICANT CHANGE UP (ref 8.5–10.1)
CHLORIDE SERPL-SCNC: 108 MMOL/L — SIGNIFICANT CHANGE UP (ref 96–108)
CO2 SERPL-SCNC: 22 MMOL/L — SIGNIFICANT CHANGE UP (ref 22–31)
COVID-19 SPIKE DOMAIN AB INTERP: POSITIVE
COVID-19 SPIKE DOMAIN ANTIBODY RESULT: 18.9 U/ML — HIGH
CREAT SERPL-MCNC: 0.9 MG/DL — SIGNIFICANT CHANGE UP (ref 0.5–1.3)
GLUCOSE SERPL-MCNC: 81 MG/DL — SIGNIFICANT CHANGE UP (ref 70–99)
HCT VFR BLD CALC: 29.1 % — LOW (ref 34.5–45)
HCT VFR BLD CALC: 33.2 % — LOW (ref 34.5–45)
HGB BLD-MCNC: 10.9 G/DL — LOW (ref 11.5–15.5)
HGB BLD-MCNC: 9.5 G/DL — LOW (ref 11.5–15.5)
INR BLD: 0.95 RATIO — SIGNIFICANT CHANGE UP (ref 0.88–1.16)
MCHC RBC-ENTMCNC: 25.3 PG — LOW (ref 27–34)
MCHC RBC-ENTMCNC: 32.8 GM/DL — SIGNIFICANT CHANGE UP (ref 32–36)
MCV RBC AUTO: 77.2 FL — LOW (ref 80–100)
PLATELET # BLD AUTO: 208 K/UL — SIGNIFICANT CHANGE UP (ref 150–400)
POTASSIUM SERPL-MCNC: 4.2 MMOL/L — SIGNIFICANT CHANGE UP (ref 3.5–5.3)
POTASSIUM SERPL-SCNC: 4.2 MMOL/L — SIGNIFICANT CHANGE UP (ref 3.5–5.3)
PROT SERPL-MCNC: 6.8 GM/DL — SIGNIFICANT CHANGE UP (ref 6–8.3)
PROTHROM AB SERPL-ACNC: 11 SEC — SIGNIFICANT CHANGE UP (ref 10.6–13.6)
RBC # BLD: 4.3 M/UL — SIGNIFICANT CHANGE UP (ref 3.8–5.2)
RBC # FLD: 15.4 % — HIGH (ref 10.3–14.5)
SARS-COV-2 IGG+IGM SERPL QL IA: 18.9 U/ML — HIGH
SARS-COV-2 IGG+IGM SERPL QL IA: POSITIVE
SARS-COV-2 RNA SPEC QL NAA+PROBE: SIGNIFICANT CHANGE UP
SODIUM SERPL-SCNC: 138 MMOL/L — SIGNIFICANT CHANGE UP (ref 135–145)
TROPONIN I, HIGH SENSITIVITY RESULT: 4.46 NG/L — SIGNIFICANT CHANGE UP
WBC # BLD: 17.14 K/UL — HIGH (ref 3.8–10.5)
WBC # FLD AUTO: 17.14 K/UL — HIGH (ref 3.8–10.5)

## 2022-02-17 PROCEDURE — 86850 RBC ANTIBODY SCREEN: CPT

## 2022-02-17 PROCEDURE — 86900 BLOOD TYPING SEROLOGIC ABO: CPT

## 2022-02-17 PROCEDURE — 93970 EXTREMITY STUDY: CPT

## 2022-02-17 PROCEDURE — 71275 CT ANGIOGRAPHY CHEST: CPT

## 2022-02-17 PROCEDURE — 93970 EXTREMITY STUDY: CPT | Mod: 26

## 2022-02-17 PROCEDURE — 85384 FIBRINOGEN ACTIVITY: CPT

## 2022-02-17 PROCEDURE — 99253 IP/OBS CNSLTJ NEW/EST LOW 45: CPT

## 2022-02-17 PROCEDURE — 86769 SARS-COV-2 COVID-19 ANTIBODY: CPT

## 2022-02-17 PROCEDURE — 59050 FETAL MONITOR W/REPORT: CPT

## 2022-02-17 PROCEDURE — 86901 BLOOD TYPING SEROLOGIC RH(D): CPT

## 2022-02-17 PROCEDURE — 93306 TTE W/DOPPLER COMPLETE: CPT | Mod: 26

## 2022-02-17 PROCEDURE — 84484 ASSAY OF TROPONIN QUANT: CPT

## 2022-02-17 PROCEDURE — 85014 HEMATOCRIT: CPT

## 2022-02-17 PROCEDURE — 85025 COMPLETE CBC W/AUTO DIFF WBC: CPT

## 2022-02-17 PROCEDURE — G0463: CPT

## 2022-02-17 PROCEDURE — 93005 ELECTROCARDIOGRAM TRACING: CPT

## 2022-02-17 PROCEDURE — 85018 HEMOGLOBIN: CPT

## 2022-02-17 PROCEDURE — 85027 COMPLETE CBC AUTOMATED: CPT

## 2022-02-17 PROCEDURE — C1889: CPT

## 2022-02-17 PROCEDURE — 36415 COLL VENOUS BLD VENIPUNCTURE: CPT

## 2022-02-17 PROCEDURE — 93010 ELECTROCARDIOGRAM REPORT: CPT

## 2022-02-17 PROCEDURE — 87635 SARS-COV-2 COVID-19 AMP PRB: CPT

## 2022-02-17 PROCEDURE — 85610 PROTHROMBIN TIME: CPT

## 2022-02-17 PROCEDURE — 94760 N-INVAS EAR/PLS OXIMETRY 1: CPT

## 2022-02-17 PROCEDURE — 59410 OBSTETRICAL CARE: CPT

## 2022-02-17 PROCEDURE — 86780 TREPONEMA PALLIDUM: CPT

## 2022-02-17 PROCEDURE — 85379 FIBRIN DEGRADATION QUANT: CPT

## 2022-02-17 PROCEDURE — 85730 THROMBOPLASTIN TIME PARTIAL: CPT

## 2022-02-17 PROCEDURE — 93306 TTE W/DOPPLER COMPLETE: CPT

## 2022-02-17 PROCEDURE — 71275 CT ANGIOGRAPHY CHEST: CPT | Mod: 26

## 2022-02-17 PROCEDURE — 80053 COMPREHEN METABOLIC PANEL: CPT

## 2022-02-17 RX ORDER — ACETAMINOPHEN 500 MG
650 TABLET ORAL EVERY 6 HOURS
Refills: 0 | Status: DISCONTINUED | OUTPATIENT
Start: 2022-02-17 | End: 2022-02-19

## 2022-02-17 RX ORDER — OXYCODONE HYDROCHLORIDE 5 MG/1
5 TABLET ORAL
Refills: 0 | Status: DISCONTINUED | OUTPATIENT
Start: 2022-02-17 | End: 2022-02-19

## 2022-02-17 RX ORDER — CITRIC ACID/SODIUM CITRATE 300-500 MG
30 SOLUTION, ORAL ORAL ONCE
Refills: 0 | Status: DISCONTINUED | OUTPATIENT
Start: 2022-02-17 | End: 2022-02-17

## 2022-02-17 RX ORDER — KETOROLAC TROMETHAMINE 30 MG/ML
30 SYRINGE (ML) INJECTION ONCE
Refills: 0 | Status: DISCONTINUED | OUTPATIENT
Start: 2022-02-17 | End: 2022-02-17

## 2022-02-17 RX ORDER — SODIUM CHLORIDE 9 MG/ML
1000 INJECTION, SOLUTION INTRAVENOUS
Refills: 0 | Status: DISCONTINUED | OUTPATIENT
Start: 2022-02-17 | End: 2022-02-17

## 2022-02-17 RX ORDER — OXYTOCIN 10 UNIT/ML
333.33 VIAL (ML) INJECTION
Qty: 20 | Refills: 0 | Status: DISCONTINUED | OUTPATIENT
Start: 2022-02-17 | End: 2022-02-19

## 2022-02-17 RX ORDER — OXYCODONE HYDROCHLORIDE 5 MG/1
5 TABLET ORAL ONCE
Refills: 0 | Status: DISCONTINUED | OUTPATIENT
Start: 2022-02-17 | End: 2022-02-19

## 2022-02-17 RX ORDER — IBUPROFEN 200 MG
600 TABLET ORAL EVERY 6 HOURS
Refills: 0 | Status: DISCONTINUED | OUTPATIENT
Start: 2022-02-17 | End: 2022-02-19

## 2022-02-17 RX ORDER — SODIUM CHLORIDE 9 MG/ML
1000 INJECTION, SOLUTION INTRAVENOUS
Refills: 0 | Status: DISCONTINUED | OUTPATIENT
Start: 2022-02-17 | End: 2022-02-19

## 2022-02-17 RX ADMIN — Medication 600 MILLIGRAM(S): at 20:36

## 2022-02-17 RX ADMIN — Medication 0.2 MILLIGRAM(S): at 10:44

## 2022-02-17 RX ADMIN — Medication 600 MILLIGRAM(S): at 21:30

## 2022-02-17 RX ADMIN — Medication 30 MILLIGRAM(S): at 12:32

## 2022-02-17 NOTE — H&P ADULT - HISTORY OF PRESENT ILLNESS
23 yo F with no pertinent PMH uncomplicated delivery approx 4 hours ago  - spontaneous vaginal delivery with approx 400cc blood loss now presents with persistent tachycardia up to 150s. Presently on pitocin. Endorses some mild chest discomfort and left shoulder discomfort. She denies any sob. No e/o hypoxia. HR persistently in 110-125bpm. No ongoing blood loss. H/H stable. No hypotension. No hemoptysis. Stat echo in progress, wet read, no obvious pericardial effusion, no RV strain and has normal EF.   She has no h/o known cardiopulmonary disease. She is no apparent distress at this time. Answering all questions appropriately No h/o VTE. Social history negative.       EKG: ST S1Q3T3 pattern, no prior for comparison, no stt changes no TWI    Social: neg x 3  Shx: none  Fhx: no h/o VTE

## 2022-02-17 NOTE — PATIENT PROFILE OB - FALL HARM RISK - UNIVERSAL INTERVENTIONS
Bed in lowest position, wheels locked, appropriate side rails in place/Call bell, personal items and telephone in reach/Instruct patient to call for assistance before getting out of bed or chair/Non-slip footwear when patient is out of bed/[place] to call system/Physically safe environment - no spills, clutter or unnecessary equipment/Purposeful Proactive Rounding/Room/bathroom lighting operational, light cord in reach

## 2022-02-17 NOTE — H&P ADULT - ASSESSMENT
#Sinus tachycardia with associated chest discomfort  #Uncomplicated spontaneous vaginal delivery  -Hemodynamically stable at present  -bedside echo wet read: no obvious effusions, wma, RV strain  -No e/o hypoxia  -CTAP ordered by GYN team  -stat troponin, dimer, pt/inr, fibrinogen  -LE dopplers to r/o dvt  -Can also be multifactorial ie blood loss, pain and Pitocin (can also cause arrythmia tachycardia/bradycardia)  -Continue IVF with crystalloids  -monitor H/H q8H for next 24 hours  -Follow up official echo report and CTA    D/W resident and nursing staff at length  Will continue to follow

## 2022-02-17 NOTE — H&P ADULT - NSHPPHYSICALEXAM_GEN_ALL_CORE
ICU Vital Signs Last 24 Hrs  T(C): 37.1 (17 Feb 2022 13:05), Max: 37.5 (17 Feb 2022 11:05)  T(F): 98.8 (17 Feb 2022 13:05), Max: 99.5 (17 Feb 2022 11:05)  HR: 97 (17 Feb 2022 13:05) (97 - 120)  BP: 115/74 (17 Feb 2022 12:50) (115/74 - 141/63)  BP(mean): --  ABP: --  ABP(mean): --  RR: 19 (17 Feb 2022 13:05) (15 - 19)  SpO2: 98% (17 Feb 2022 13:05) (97% - 100%)      PHYSICAL EXAM:    Constitutional: NAD, awake and alert  HEENT: PERR, EOMI, Normal Hearing, MMM  Neck: Soft and supple, No LAD, No JVD  Respiratory: Breath sounds are clear bilaterally, No wheezing, rales or rhonchi  Cardiovascular: S1 and S2, regular rate and rhythm, no Murmurs, gallops or rubs  Gastrointestinal: Bowel Sounds present, soft, nontender, nondistended, no guarding, no rebound  Extremities: No peripheral edema  Vascular: 2+ peripheral pulses  Neurological: A/O x 3, no focal deficits  Musculoskeletal: 5/5 strength b/l upper and lower extremities  Skin: No rashes

## 2022-02-17 NOTE — PATIENT PROFILE OB - BABY A: APGAR 1 MIN
9 Trilobed Flap Text: The defect edges were debeveled with a #15 scalpel blade.  Given the location of the defect and the proximity to free margins a trilobed flap was deemed most appropriate.  Using a sterile surgical marker, an appropriate trilobed flap drawn around the defect.    The area thus outlined was incised deep to adipose tissue with a #15 scalpel blade.  The skin margins were undermined to an appropriate distance in all directions utilizing iris scissors.

## 2022-02-17 NOTE — PATIENT PROFILE OB - AS LABOR RUPTURE OF MEMBRANES YN
[place] ambulatory encounter  FAMILY PRACTICE OFFICE VISIT    CHIEF COMPLAINT:    Chief Complaint   Patient presents with   • Back Pain     right scapula into neck and down to elbow        SUBJECTIVE:  Lucinda Woods is a 47 year old female who presented accompanied by boyfriend Allen, requesting evaluation for   C/O right arm pain that starts in right elbow and radiates to scapula and down to the wrist. She has had chronic upper back pain for several years ans she is use to live in pain but this pain is so bad that she states she needs to have surgery and fix what ever is causing this pain. 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 Patient states she wants to get to the bottom of what is causing her symptoms to be able to fix it. She has tried multiple medications for her pain. She is also under the care of chiropractor for her neck and upper back pain.       Review of systems:   Constitutional: Negative for fever and chills.   Skin: Negative for rash.   HEENT: Negative for eye drainage, rhinorrhea, ear pain, sore throat.  Respiratory: Negative for cough, wheezing or shortness of breath.    Cardiovascular: Negative for chest pain, chest pressure or palpitations.   Gastrointestinal: Negative for nausea, vomiting, diarrhea.   Genitourinary: Negative for dysuria, urgency, frequency or hematuria.  Extremities:  Negative for joint swelling. Positive for pain in the upper back right side and right elbow down dot wrist. States she does not have feeling in the right upper arm due to h/o mastectomy. But her more intense pain is in the right elbow and forearm.   Neurologic:  Negative for change in sensory or motor function.   Endocrine: Negative for heat or cold intolerance.     OBJECTIVE:  PROBLEM LIST:   Patient Active Problem List   Diagnosis   • Status post right mastectomy   • Malignant neoplasm of right female breast (CMS/HCC)   • Depression   • Cervicalgia   • ADHD (attention deficit hyperactivity disorder),  combined type   • Insomnia, transient   • Irritability and anger   • Anxiety   • Vitamin D deficiency   • Environmental allergies   • Cutaneous skin tags   • Chronic headache   • Cervical radicular pain-right       PAST HISTORIES:   I have reviewed the past medical history, family history, social history, medications and allergies listed in the medical record as obtained by my nursing staff and support staff and agree with their documentation.  ALLERGIES:   Allergen Reactions   • Iodine Other (See Comments)     tingling     Current Outpatient Prescriptions   Medication Sig Dispense Refill   • ranitidine (ZANTAC) 150 MG tablet TAKE 1 TABLET BY MOUTH TWICE DAILY 180 tablet 0   • TAMOXIFEN CITRATE PO      • atomoxetine (STRATTERA) 100 MG capsule TAKE 1 CAPSULE BY MOUTH DAILY 30 capsule 0   • cholecalciferol (VITAMIN D3) 1000 UNITS tablet Take 1,000 Units by mouth daily.     • HYDROcodone-acetaminophen (NORCO) 5-325 MG per tablet Take 1 tablet by mouth every 6 hours as needed for Pain. Take with food. 5 tablet 0   • diclofenac (VOLTAREN) 1 % gel Apply topically 4 times daily. Apply to the affected area 4 times a day. 300 g 0   • meloxicam (MOBIC) 7.5 MG tablet TAKE 1 TABLET BY MOUTH DAILY WITH FOOD. STOP IF CAUSES STOMACH UPSET 90 tablet 0   • PROAIR  (90 Base) MCG/ACT inhaler INHALE 2 PUFFS BY MOUTH EVERY 4-6 HOURS AS NEEDED 8.5 g 0   • sumatriptan (IMITREX) 50 MG tablet TAKE 1 TABLET BY MOUTH AT ONSET OF MIGRAINE, MAY REPEAT AFTER 2 HOURS IF NEEDED 10 tablet 3   • cetirizine (ZYRTEC) 10 MG tablet Take 1 tablet by mouth daily. 30 tablet 3   • fluticasone (FLONASE) 50 MCG/ACT nasal spray SHAKE LIQUID AND USE 2 SPRAYS IN EACH NOSTRIL DAILY 60 Bottle 1   • ibuprofen (MOTRIN) 600 MG tablet Take 1 tablet by mouth 4 times daily (before meals and nightly). 6 tablet 0   • Cyanocobalamin (VITAMIN B 12 PO) Take 500 mg by mouth 2 times daily.     • ALPRAZolam (XANAX) 0.25 MG tablet Take 1 tablet by mouth nightly as needed  for Sleep or Anxiety. 30 tablet 2     No current facility-administered medications for this visit.      Past Medical History:   Diagnosis Date   • Anxiety    • Arthritis    • Attention deficit disorder     ADHD   • COPD (chronic obstructive pulmonary disease) (CMS/[facility])    • Depression    • Gastroesophageal reflux disease    • Malignant neoplasm (CMS/[facility]) 12/2016    right breast Invasive lobular carcinoma   • Segmental and somatic dysfunction of lumbar region 2017   • Segmental and somatic dysfunction of sacral region 2017     Past Surgical History:   Procedure Laterality Date   • Breast reconstruction Right 03/02/2017    Right direct implant breast reconstruction with revision and drainage placement   • Breast surgery Right    • Mastectomy Right 02/03/2017   • Tubal ligation  05/10/2017    Complete bilateral salpingectomies, all anatomy is seen was normal     Social History     Social History   • Marital status: Single     Spouse name: N/A   • Number of children: N/A   • Years of education: N/A     Social History Main Topics   • Smoking status: Former Smoker   • Smokeless tobacco: Never Used   • Alcohol use Yes      Comment: twice a week, 3 drinks   • Drug use: Yes     Types: Marijuana      Comment: couple time per week (last use 5/4/17)   • Sexual activity: Yes     Partners: Male     Birth control/ protection: Surgical     Other Topics Concern   • None     Social History Narrative   • None     Family History   Problem Relation Age of Onset   • Cancer Mother      stomsch   • Diabetes Father        PHYSICAL EXAM:   Vital Signs:    Visit Vitals  BP (!) 148/98   Pulse 88   Wt 73.3 kg   LMP 02/01/2018   BMI 28.63 kg/m²     General:   Alert, cooperative, in no acute distress.  Skin:  Warm and dry without rash.    Head:  Normocephalic, atraumatic.   Neck:  Trachea is midline. No adenopathy.  Normal thyroid without mass or tenderness.   ENT:  Mucous membranes are moist.   Cardiovascular:  Symmetrical pulses.  Regular rate  and rhythm without murmur.  Respiratory:  Normal respiratory effort.  Clear to auscultation.  No wheezes, rales or rhonchi.  Musculoskeletal:  No deformity or edema. Positive for right shoulder, elbow and forearm tenderness to palpation. Normal strength and reflexes.   Back:   Normal alignment.  No costovertebral angle tenderness or midline bony tenderness. Positive for bilateral upper back tenderness.   Neurologic:   Oriented x4.  Cranial nerves II-XII are intact.  No focal deficits or lateralizing signs.  Psychiatric:   Cooperative.  Appropriate mood and affect.      ASSESSMENT:   1. Right elbow pain    2. Right shoulder pain, unspecified chronicity        PLAN:   Orders Placed This Encounter   • XR Shoulder 3 View Right   • XR Elbow 3 View Right   • DISCONTD: meloxicam (MOBIC) 7.5 MG tablet   • diclofenac (VOLTAREN) 1 % gel     Patient has been counseled about symptoms, work up and medication's side effects and benefits.  Patient agrees with plan of care and will follow up with PCP in 4-6 weeks. May return sooner if not better or worse.  We will call patient with test results to discuss further plan of care.    Instructions provided as documented in the after visit summary.    The patient indicated understanding of the diagnosis and agreed with the plan of care.     Sydney Ron MD         yes

## 2022-02-17 NOTE — H&P ADULT - NSHPLABSRESULTS_GEN_ALL_CORE
LABS: All Labs Reviewed:                        10.9   17.14 )-----------( 208      ( 17 Feb 2022 12:27 )             33.2     02-17    138  |  108  |  7   ----------------------------<  81  4.2   |  22  |  0.90    Ca    8.7      17 Feb 2022 13:42    TPro  6.8  /  Alb  2.4<L>  /  TBili  0.5  /  DBili  x   /  AST  28  /  ALT  15  /  AlkPhos  234<H>  02-17    PT/INR - ( 17 Feb 2022 13:35 )   PT: 11.0 sec;   INR: 0.95 ratio         PTT - ( 17 Feb 2022 13:35 )  PTT:25.7 sec          Blood Culture:       EKG as above

## 2022-02-17 NOTE — CONSULT NOTE ADULT - SUBJECTIVE AND OBJECTIVE BOX
23 yo F with no pertinent PMH uncomplicated delivery approx 4 hours ago  - spontaneous vaginal delivery with approx 400cc blood loss now presents with persistent tachycardia up to 150s. Presently on pitocin. Endorses some mild chest discomfort and left shoulder discomfort. She denies any sob. No e/o hypoxia. HR persistently in 110-125bpm. No ongoing blood loss. H/H stable. No hypotension. No hemoptysis. Stat echo in progress, wet read, no obvious pericardial effusion, no RV strain and has normal EF.   She has no h/o known cardiopulmonary disease. She is no apparent distress at this time. Answering all questions appropriately No h/o VTE. Social history negative.       EKG: ST S1Q3T3 pattern, no prior for comparison, no stt changes no TWI    Social: neg x 3  Shx: none  Fhx: no h/o VTE     Review of Systems:  Review of Systems: REVIEW OF SYSTEMS:    CONSTITUTIONAL: No weakness, fevers or chills  EYES/ENT: No visual changes;  No vertigo or throat pain   NECK: No pain or stiffness  RESPIRATORY: No cough, wheezing, hemoptysis; No shortness of breath  CARDIOVASCULAR: No chest pain or palpitations  GASTROINTESTINAL: No abdominal or epigastric pain. No nausea, vomiting, or hematemesis; No diarrhea or constipation. No melena or hematochezia.  GENITOURINARY: No dysuria, frequency or hematuria  NEUROLOGICAL: No numbness or weakness  SKIN: No itching, burning, rashes, or lesions   All other review of systems is negative unless indicated above        Allergies and Intolerances:        Allergies:  	No Known Allergies:     Home Medications:   * Outpatient Medication Status not yet specified  Patient History:    Social History:  Social History (marital status, living situation, occupation, tobacco use, alcohol and drug use, and sexual history): as above     Tobacco Screening:  · Core Measure Site	Yes  · Has the patient used tobacco in the past 30 days?	No    Risk Assessment:    Present on Admission:  Deep Venous Thrombosis	no  Pulmonary Embolus	no  Pressure Ulcer(s)	no     Heart Failure:  Does this patient have a history of or has been diagnosed with heart failure? no.    HIV Screen (per [facility] Department of Health, HIV screening must be offered to every individual between ages 13 and 64)	Offered and patient declined    Physical Exam:   Physical Exam: ICU Vital Signs Last 24 Hrs  T(C): 37.1 (2022 13:05), Max: 37.5 (2022 11:05)  T(F): 98.8 (2022 13:05), Max: 99.5 (2022 11:05)  HR: 97 (2022 13:05) (97 - 120)  BP: 115/74 (2022 12:50) (115/74 - 141/63)  BP(mean): --  ABP: --  ABP(mean): --  RR: 19 (2022 13:05) (15 - 19)  SpO2: 98% (2022 13:05) (97% - 100%)      PHYSICAL EXAM:    Constitutional: NAD, awake and alert  HEENT: PERR, EOMI, Normal Hearing, MMM  Neck: Soft and supple, No LAD, No JVD  Respiratory: Breath sounds are clear bilaterally, No wheezing, rales or rhonchi  Cardiovascular: S1 and S2, regular rate and rhythm, no Murmurs, gallops or rubs  Gastrointestinal: Bowel Sounds present, soft, nontender, nondistended, no guarding, no rebound  Extremities: No peripheral edema  Vascular: 2+ peripheral pulses  Neurological: A/O x 3, no focal deficits  Musculoskeletal: 5/5 strength b/l upper and lower extremities  Skin: No rashes       Labs and Results:  Labs, Radiology, Cardiology, and Other Results: LABS: All Labs Reviewed:                        10.9   .14 )-----------( 208      ( 2022 12:27 )             33.2         138  |  108  |  7   ----------------------------<  81  4.2   |  22  |  0.90    Ca    8.7      2022 13:42    TPro  6.8  /  Alb  2.4<L>  /  TBili  0.5  /  DBili  x   /  AST  28  /  ALT  15  /  AlkPhos  234<H>      PT/INR - ( 2022 13:35 )   PT: 11.0 sec;   INR: 0.95 ratio         PTT - ( 2022 13:35 )  PTT:25.7 sec          Blood Culture:       EKG as above    Assessment and Plan:    Assessment:  · Assessment	      #Sinus tachycardia with associated chest discomfort  #Uncomplicated spontaneous vaginal delivery  -Hemodynamically stable at present  -bedside echo wet read: no obvious effusions, wma, RV strain  -No e/o hypoxia  -CTAP ordered by GYN team  -stat troponin, dimer, pt/inr, fibrinogen  -LE dopplers to r/o dvt  -Can also be multifactorial ie blood loss, pain and Pitocin (can also cause arrythmia tachycardia/bradycardia)  -Continue IVF with crystalloids  -monitor H/H q8H for next 24 hours  -Follow up official echo report and CTA    D/W resident and nursing staff at length  Will continue to follow    Time: 58 min

## 2022-02-18 ENCOUNTER — NON-APPOINTMENT (OUTPATIENT)
Age: 22
End: 2022-02-18

## 2022-02-18 ENCOUNTER — APPOINTMENT (OUTPATIENT)
Dept: OBGYN | Facility: CLINIC | Age: 22
End: 2022-02-18

## 2022-02-18 LAB
HCT VFR BLD CALC: 25.4 % — LOW (ref 34.5–45)
HCT VFR BLD CALC: 26.3 % — LOW (ref 34.5–45)
HCT VFR BLD CALC: 26.5 % — LOW (ref 34.5–45)
HGB BLD-MCNC: 8.4 G/DL — LOW (ref 11.5–15.5)
HGB BLD-MCNC: 8.7 G/DL — LOW (ref 11.5–15.5)
HGB BLD-MCNC: 8.7 G/DL — LOW (ref 11.5–15.5)
MCHC RBC-ENTMCNC: 25.7 PG — LOW (ref 27–34)
MCHC RBC-ENTMCNC: 33.1 GM/DL — SIGNIFICANT CHANGE UP (ref 32–36)
MCV RBC AUTO: 77.8 FL — LOW (ref 80–100)
PLATELET # BLD AUTO: 171 K/UL — SIGNIFICANT CHANGE UP (ref 150–400)
RBC # BLD: 3.38 M/UL — LOW (ref 3.8–5.2)
RBC # FLD: 15.5 % — HIGH (ref 10.3–14.5)
T PALLIDUM AB TITR SER: NEGATIVE — SIGNIFICANT CHANGE UP
WBC # BLD: 11.43 K/UL — HIGH (ref 3.8–10.5)
WBC # FLD AUTO: 11.43 K/UL — HIGH (ref 3.8–10.5)

## 2022-02-18 PROCEDURE — 99232 SBSQ HOSP IP/OBS MODERATE 35: CPT

## 2022-02-18 RX ADMIN — Medication 600 MILLIGRAM(S): at 09:08

## 2022-02-18 RX ADMIN — Medication 600 MILLIGRAM(S): at 15:28

## 2022-02-18 RX ADMIN — Medication 600 MILLIGRAM(S): at 02:48

## 2022-02-18 RX ADMIN — Medication 650 MILLIGRAM(S): at 06:34

## 2022-02-18 RX ADMIN — Medication 650 MILLIGRAM(S): at 00:30

## 2022-02-18 RX ADMIN — Medication 600 MILLIGRAM(S): at 03:30

## 2022-02-18 RX ADMIN — Medication 650 MILLIGRAM(S): at 21:13

## 2022-02-18 NOTE — PROGRESS NOTE ADULT - ASSESSMENT
#Sinus tachycardia with associated chest discomfort improved   #Uncomplicated spontaneous vaginal delivery  -Hemodynamically stable at present  -Echo wet read: no obvious effusions, wma, RV strain  -Estimated left ventricular ejection fraction is 60 %.   The left ventricle is normal in size, wall thickness, wall motion and   contractility.  -No e/o hypoxia  -CTA PE ordered by GYN team  -LE dopplers to r/o dvt  -Can also be multifactorial ie blood loss, pain and Pitocin (can also cause arrythmia tachycardia/bradycardia)  -Continue IVF with crystalloids  -monitor H/H q8H for next 24 hours   CTA negative x PE    #Sinus tachycardia with associated chest discomfort improved   #Uncomplicated spontaneous vaginal delivery  -Hemodynamically stable at present  -Echo wet read: no obvious effusions, wma, RV strain  -Estimated left ventricular ejection fraction is 60 %.   The left ventricle is normal in size, wall thickness, wall motion and   contractility.  -No e/o hypoxia  -CTA PE ordered by GYN team  -LE dopplers to r/o dvt  -Can also be multifactorial ie blood loss, pain and Pitocin (can also cause arrythmia tachycardia/bradycardia)  -Continue IVF with crystalloids  -monitor H/H q8H for next 24 hours   CTA negative x PE     Patient whit no medical contraindication for discharge

## 2022-02-18 NOTE — PROGRESS NOTE ADULT - SUBJECTIVE AND OBJECTIVE BOX
Subjective:  Patient is a 22y old  Female who presents with a chief complaint of tachycardia (17 Feb 2022 16:53)   Patient seen and examined at bedside earlier today,   Feeling good  No chest or Shortness of breath   OBJECTIVE:   T(C): 36.7 (02-18-22 @ 16:00), Max: 36.7 (02-18-22 @ 04:45)  HR: 96 (02-18-22 @ 16:00) (87 - 99)  BP: 110/81 (02-18-22 @ 16:00) (110/52 - 129/84)  RR: 16 (02-18-22 @ 16:00) (16 - 16)  SpO2: 98% (02-18-22 @ 16:00) (98% - 98%)  PHYSICAL EXAM:  GENERAL: NAD  HEAD:  Atraumatic, Normocephalic  EYES: EOMI, PERRLA, conjunctiva and sclera clear  HEENT: Moist mucous membranes  NECK: Supple, No JVD  CHEST/LUNG: Clear to auscultation bilaterally; No rales, no rhonchi, no wheezing  HEART: Regular rate and rhythm; No murmurs, no rubs or gallops  ABDOMEN: Soft, Nontender, Nondistended; Bowel sounds present  GENITOURINARY- Voiding, no suprapubic tenderness  EXTREMITIES:  2+ Peripheral Pulses, No clubbing, cyanosis,   edema  MUSCULOSKELETAL:- No muscle tenderness, Muscle tone normal, No joint tenderness, no Joint swelling,  Joint ROM -normal  SKIN-no rash, no lesion    LABS: all reviewed                         8.4    x     )-----------( x        ( 18 Feb 2022 12:33 )             25.4     02-17    138  |  108  |  7   ----------------------------<  81  4.2   |  22  |  0.90    Ca    8.7      17 Feb 2022 13:42    TPro  6.8  /  Alb  2.4<L>  /  TBili  0.5  /  DBili  x   /  AST  28  /  ALT  15  /  AlkPhos  234<H>  02-17    PT/INR - ( 17 Feb 2022 13:35 )   PT: 11.0 sec;   INR: 0.95 ratio         PTT - ( 17 Feb 2022 13:35 )  PTT:25.7 sec    CAPILLARY BLOOD GLUCOSE      RECENT CULTURES:    RADIOLOGY & ADDITIONAL TESTS: all reviewed   EKG reviewed        Current medications:  acetaminophen     Tablet .. 650 milliGRAM(s) Oral every 6 hours PRN  ibuprofen  Tablet. 600 milliGRAM(s) Oral every 6 hours  lactated ringers. 1000 milliLiter(s) IV Continuous <Continuous>  oxyCODONE    IR 5 milliGRAM(s) Oral every 3 hours PRN  oxyCODONE    IR 5 milliGRAM(s) Oral once PRN  oxytocin Infusion 333.333 milliUNIT(s)/Min IV Continuous <Continuous>

## 2022-02-19 ENCOUNTER — TRANSCRIPTION ENCOUNTER (OUTPATIENT)
Age: 22
End: 2022-02-19

## 2022-02-19 VITALS
SYSTOLIC BLOOD PRESSURE: 131 MMHG | HEART RATE: 89 BPM | RESPIRATION RATE: 16 BRPM | TEMPERATURE: 98 F | DIASTOLIC BLOOD PRESSURE: 82 MMHG | OXYGEN SATURATION: 99 %

## 2022-02-19 LAB
HCT VFR BLD CALC: 27.5 % — LOW (ref 34.5–45)
HCT VFR BLD CALC: 27.6 % — LOW (ref 34.5–45)
HGB BLD-MCNC: 9 G/DL — LOW (ref 11.5–15.5)
HGB BLD-MCNC: 9.1 G/DL — LOW (ref 11.5–15.5)

## 2022-02-19 PROCEDURE — 99232 SBSQ HOSP IP/OBS MODERATE 35: CPT

## 2022-02-19 RX ORDER — IBUPROFEN 200 MG
1 TABLET ORAL
Qty: 0 | Refills: 0 | DISCHARGE
Start: 2022-02-19

## 2022-02-19 RX ORDER — ACETAMINOPHEN 500 MG
2 TABLET ORAL
Qty: 0 | Refills: 0 | DISCHARGE
Start: 2022-02-19

## 2022-02-19 RX ADMIN — Medication 600 MILLIGRAM(S): at 06:35

## 2022-02-19 RX ADMIN — Medication 600 MILLIGRAM(S): at 00:40

## 2022-02-19 RX ADMIN — Medication 650 MILLIGRAM(S): at 03:37

## 2022-02-19 RX ADMIN — Medication 600 MILLIGRAM(S): at 12:05

## 2022-02-19 NOTE — PROGRESS NOTE ADULT - SUBJECTIVE AND OBJECTIVE BOX
HOSPITALIST PROGRESS NOTE:  SUBJECTIVE:  PCP:  Chief Complaint: Patient is a 22y old  Female who presents with a chief complaint of  (2022 10:36)      HPI:  23 yo F with no pertinent PMH uncomplicated delivery approx 4 hours ago  - spontaneous vaginal delivery with approx 400cc blood loss now presents with persistent tachycardia up to 150s. Presently on pitocin. Endorses some mild chest discomfort and left shoulder discomfort. She denies any sob. No e/o hypoxia. HR persistently in 110-125bpm. No ongoing blood loss. H/H stable. No hypotension. No hemoptysis. Stat echo in progress, wet read, no obvious pericardial effusion, no RV strain and has normal EF.   She has no h/o known cardiopulmonary disease. She is no apparent distress at this time. Answering all questions appropriately No h/o VTE. Social history negative.     : ABove reviewed; patient has no further chest pain; hr is better; no other complaints;     Allergies:  No Known Allergies    REVIEW OF SYSTEMS:  See HPI. All other review of systems is negative unless indicated above.     OBJECTIVE  Physical Exam:  Vital Signs:    Vital Signs Last 24 Hrs  T(C): 36.7 (2022 09:06), Max: 36.7 (2022 16:00)  T(F): 98.1 (2022 09:06), Max: 98.1 (2022 09:06)  HR: 89 (2022 09:06) (77 - 96)  BP: 131/82 (2022 09:06) (110/81 - 131/82)  BP(mean): --  RR: 16 (2022 09:06) (16 - 16)  SpO2: 99% (2022 09:06) (97% - 99%)  I&O's Summary      Constitutional: NAD, awake and alert  Neurological: AAO x 3, no focal deficits  HEENT: PERRLA, EOMI, MMM  Neck: Soft and supple, No LAD, No JVD  Respiratory: Breath sounds are clear bilaterally, No wheezing, rales or rhonchi  Cardiovascular: S1 and S2, regular rate and rhythm; no Murmurs, gallops or rubs  Gastrointestinal: Bowel Sounds present, soft, nontender, nondistended, no guarding, no rebound tenderness  Back: No CVA tenderness   Extremities: No peripheral edema  Vascular: 2+ peripheral pulses  Musculoskeletal: 5/5 strength b/l upper and lower extremities  Skin: No rashes  Breast: Deferred  Rectal: Deferred    MEDICATIONS  (STANDING):  ibuprofen  Tablet. 600 milliGRAM(s) Oral every 6 hours  lactated ringers. 1000 milliLiter(s) (125 mL/Hr) IV Continuous <Continuous>  oxytocin Infusion 333.333 milliUNIT(s)/Min (1000 mL/Hr) IV Continuous <Continuous>      LABS: All Labs Reviewed:                        9.0    x     )-----------( x        ( 2022 12:30 )             27.5         138  |  108  |  7   ----------------------------<  81  4.2   |  22  |  0.90    Ca    8.7      2022 13:42    TPro  6.8  /  Alb  2.4<L>  /  TBili  0.5  /  DBili  x   /  AST  28  /  ALT  15  /  AlkPhos  234<H>      PT/INR - ( 2022 13:35 )   PT: 11.0 sec;   INR: 0.95 ratio         PTT - ( 2022 13:35 )  PTT:25.7 sec    RADIOLOGY/EKG:    < from: CT Angio Chest PE Protocol w/ IV Cont (22 @ 14:38) >  IMPRESSION:    No pulmonary embolus.    Multichamber cardiac enlargement.    < end of copied text >  < from: US Duplex Venous Lower Ext Complete, Bilateral (22 @ 14:57) >    IMPRESSION:  No evidence of deep venous thrombosis in either lower extremity.    < end of copied text >    tt< from: TTE Echo Complete w/o Contrast w/ Doppler (22 @ 14:21) >     Impression     Summary     Estimated left ventricular ejection fraction is 60 %.   The left ventricle is normal in size, wall thickness, wall motion and   contractility.   Normal appearing left atrium.   Normal appearing right atrium.   Normal appearing right ventricle structure and function.   Normal aortic valve structure and function.   The mitral valve leaflets appear thin and normal.   Trace mitral regurgitation is present.   No evidence of pericardial effusion.   All visualized extra cardiac structures appears to be normal.     Signature    < end of copied text >

## 2022-02-19 NOTE — DISCHARGE NOTE OB - NS MD DC FALL RISK RISK
For information on Fall & Injury Prevention, visit: [email or web address] [place]  [email or web address] OR  https://www.cdc.gov/steadi/patient.html

## 2022-02-19 NOTE — DISCHARGE NOTE OB - MATERIALS PROVIDED
Vaccinations/[facility]  Screening Program/  Immunization Record/Breastfeeding Log/Breastfeeding Mother’s Support Group Information/Guide to Postpartum Care/[facility] Hearing Screen Program/Back To Sleep Handout/Shaken Baby Prevention Handout/Breastfeeding Guide and Packet

## 2022-02-19 NOTE — DISCHARGE NOTE OB - HOSPITAL COURSE
The patient was admitted to  for  @40w2d. Her labor was complicated by a labial laceration and 1st degree perineal tear w/ EBL of 410cc. The patient received methergine. Her post operative course was complicated by persistent tachycardia in the 110s-120s in the absence of ongoing blood loss. She also complained of mild chest discomfort. Hospitalist (Dr. Woodward) was consulted for assistance in management of tachycardia. An echo was performed which demonstrated normal LV systolic function and an EF of 60%. A CTA chest was performed which was negative for PE. B/l lower extremity duplex ultrasonography was performed and was negative for DVT. Troponin was negative. The patient's tachycardia spontaneously subsided over the next day. Tachycardia was likely multifactorial in origin (blood loss, pain, pitocin). The remainder of the postpartum course was uncomplicated. Patient stable for discharge home w/ follow-up with primary OBGYN in 1 month.

## 2022-02-19 NOTE — DISCHARGE NOTE OB - CARE PROVIDER_API CALL
Shai Preciado)  Obstetrics and Gynecology  [address] [place] [address]  Phone: [phone]  Fax: [phone]  Established Patient  Follow Up Time: 1 month

## 2022-02-19 NOTE — DISCHARGE NOTE OB - CARE PLAN
Principal Discharge DX:	Vaginal delivery  Assessment and plan of treatment:	nothing per vagina for 6 weeks   1

## 2022-02-19 NOTE — DISCHARGE NOTE OB - MEDICATION SUMMARY - MEDICATIONS TO TAKE
I will START or STAY ON the medications listed below when I get home from the hospital:    acetaminophen 325 mg oral tablet  -- 2 tab(s) by mouth every 6 hours, As needed, Mild Pain (1 - 3)  -- Indication: For Other pregnancy-related conditions, antepartum    ibuprofen 600 mg oral tablet  -- 1 tab(s) by mouth every 6 hours, As Needed  -- Indication: For Other pregnancy-related conditions, antepartum

## 2022-02-19 NOTE — DISCHARGE NOTE OB - PATIENT PORTAL LINK FT
You can access the FollowMyHealth Patient Portal offered by [facility] by registering at the following website: [email or web address]. By joining [employer]’s FollowMyHealth portal, you will also be able to view your health information using other applications (apps) compatible with our system.

## 2022-02-19 NOTE — PROGRESS NOTE ADULT - SUBJECTIVE AND OBJECTIVE BOX
*********CHARTING IN PROGRESS***********      MARY BALLARD is a 23yo GP now PPD# s/p spontaneous vaginal delivery at *** weeks gestation    S:    The patient has no complaints.  Pain controlled with current medications.   She is ambulating without difficulty and tolerating PO   + flatus/-BM/+ voiding     O:    T(C): 36.5 (02-18-22 @ 23:58), Max: 36.7 (02-18-22 @ 12:04)  HR: 77 (02-18-22 @ 23:58) (77 - 99)  BP: 113/86 (02-18-22 @ 23:58) (110/81 - 129/84)  RR: 16 (02-18-22 @ 23:58) (16 - 16)  SpO2: 97% (02-18-22 @ 23:58) (97% - 98%)    Gen: NAD, AOx3  CV: RRR  Pulm: CTAB  Breast: nontender, non-engorged   Abdomen:  soft, non-tender, non-distended, +bowel sounds.  Uterus:  Fundus firm below umbilicus  VE:  +lochia  Ext:  Non-tender.                          8.7    x     )-----------( x        ( 18 Feb 2022 22:07 )             26.5     02-17    138  |  108  |  7   ----------------------------<  81  4.2   |  22  |  0.90    Ca    8.7      17 Feb 2022 13:42    TPro  6.8  /  Alb  2.4<L>  /  TBili  0.5  /  DBili  x   /  AST  28  /  ALT  15  /  AlkPhos  234<H>  02-17      A/P:  Patient is a 23yo GP now PPD# s/p spontaneous vaginal delivery at ** weeks gestation  -Vital signs stable  -Postpartum H&H stable/pending  -Voiding, tolerating PO, bowel function nml   -Advance care as tolerated   -Continue routine postpartum care and education.  -Healthy male infant, desires/declines circumcision.   MARY BALLARD is a 21yo  now PPD#2 s/p spontaneous vaginal delivery at 40w2d weeks gestation    S:    The patient has no complaints, feels ready to go home. Had tachycardia workup 2 days ago. Currently denies palpitations, tachycardia, pleuritic chest pain.  Pain controlled with current medications.   She is ambulating without difficulty and tolerating PO   + flatus/-BM/+ voiding     O:    T(C): 36.5 (22 @ 23:58), Max: 36.7 (22 @ 12:04)  HR: 77 (22 @ 23:58) (77 - 99)  BP: 113/86 (22 @ 23:58) (110/81 - 129/84)  RR: 16 (22 @ 23:58) (16 - 16)  SpO2: 97% (22 @ 23:58) (97% - 98%)    Gen: NAD, AOx3  CV: RRR, s1/s2+ no m/r/g noted on exam  Pulm: CTA b/l, no w/r/r  Abdomen:  soft, non-tender, non-distended, +bowel sounds.  Uterus:  Fundus firm below umbilicus  VE:  +lochia  Ext:  WWP, trace edema b/l LE's. Non-tender.                          8.7    x     )-----------( x        ( 2022 22:07 )             26.5         138  |  108  |  7   ----------------------------<  81  4.2   |  22  |  0.90    Ca    8.7      2022 13:42    TPro  6.8  /  Alb  2.4<L>  /  TBili  0.5  /  DBili  x   /  AST  28  /  ALT  15  /  AlkPhos  234<H>        A/P:  Patient is a 21yo  now PPD#2 s/p spontaneous vaginal delivery at 40w2d weeks gestation  -Vital signs stable  -Postpartum H&H stable  -Voiding, tolerating PO, bowel function nml   -Advance care as tolerated   -Continue routine postpartum care and education.   MARY BALLARD is a 21yo  now PPD#2 s/p spontaneous vaginal delivery at 40w2d weeks gestation    S:    The patient has no complaints, feels ready to go home. Had tachycardia workup 2 days ago. Currently denies palpitations, tachycardia, pleuritic chest pain.  Pain controlled with current medications.   She is ambulating without difficulty and tolerating PO   + flatus/-BM/+ voiding     O:    T(C): 36.5 (22 @ 23:58), Max: 36.7 (22 @ 12:04)  HR: 77 (22 @ 23:58) (77 - 99)  BP: 113/86 (22 @ 23:58) (110/81 - 129/84)  RR: 16 (22 @ 23:58) (16 - 16)  SpO2: 97% (22 @ 23:58) (97% - 98%)    Gen: NAD, AOx3  CV: RRR, s1/s2+ no m/r/g noted on exam  Pulm: CTA b/l, no w/r/r  Abdomen:  soft, non-tender, non-distended, +bowel sounds.  Uterus:  Fundus firm below umbilicus  VE:  +scant lochia noted on pad  Ext:  WWP, trace edema b/l LE's. Non-tender.                          8.7    x     )-----------( x        ( 2022 22:07 )             26.5     -    138  |  108  |  7   ----------------------------<  81  4.2   |  22  |  0.90    Ca    8.7      2022 13:42    TPro  6.8  /  Alb  2.4<L>  /  TBili  0.5  /  DBili  x   /  AST  28  /  ALT  15  /  AlkPhos  234<H>  -      A/P:  Patient is a 21yo  now PPD#2 s/p spontaneous vaginal delivery at 40w2d weeks gestation  - Vital signs stable  - Postpartum H&H stable  - Voiding, tolerating PO, bowel function nml   - stable for discharge, patient to follow-up with primary OBGYN in 1 month

## 2022-02-19 NOTE — PROGRESS NOTE ADULT - ASSESSMENT
#Sinus tachycardia with associated chest discomfort - resolved   #Uncomplicated spontaneous vaginal delivery  -Hemodynamically stable at present  -Echo wet read: no obvious effusions, wma, RV strain  -Estimated left ventricular ejection fraction is 60 %.   The left ventricle is normal in size, wall thickness, wall motion and   contractility.  -No e/o hypoxia  -CTA neg for PE  -LE dopplers neg for DVT  -Can also be multifactorial ie blood loss, pain and Pitocin (can also cause arrythmia tachycardia/bradycardia)  - H/H stable     Patient whit no medical contraindication for discharge; if tachycardia persists as outpatient then she will need to FU with a cardiologist; discussed with patient

## 2022-02-23 DIAGNOSIS — Z3A.40 40 WEEKS GESTATION OF PREGNANCY: ICD-10-CM

## 2022-02-23 DIAGNOSIS — O26.893 OTHER SPECIFIED PREGNANCY RELATED CONDITIONS, THIRD TRIMESTER: ICD-10-CM

## 2022-02-23 DIAGNOSIS — R00.0 TACHYCARDIA, UNSPECIFIED: ICD-10-CM

## 2022-03-02 ENCOUNTER — NON-APPOINTMENT (OUTPATIENT)
Age: 22
End: 2022-03-02

## 2022-04-01 ENCOUNTER — APPOINTMENT (OUTPATIENT)
Dept: OBGYN | Facility: CLINIC | Age: 22
End: 2022-04-01
Payer: OTHER GOVERNMENT

## 2022-04-01 VITALS
BODY MASS INDEX: 20.32 KG/M2 | SYSTOLIC BLOOD PRESSURE: 120 MMHG | DIASTOLIC BLOOD PRESSURE: 60 MMHG | WEIGHT: 119 LBS | HEIGHT: 64 IN

## 2022-04-01 PROCEDURE — 0503F POSTPARTUM CARE VISIT: CPT

## 2022-04-01 NOTE — HISTORY OF PRESENT ILLNESS
[Postpartum Follow Up] : postpartum follow up [Complications:___] : no complications [] : delivered by vaginal delivery [Breastfeeding] : currently nursing [BF with Difficulty] : nursing without difficulty [S/Sx PP Depression] : no signs/symptoms of postpartum depression [Erythema] : not erythematous [Back to Normal] : is back to normal in size [Mild] : mild vaginal bleeding [Normal] : the vagina was normal [Cervix Sample Taken] : cervical sample not taken for a Pap smear [Not Done] : Examination of breasts not done [Doing Well] : is doing well [No Sign of Infection] : is showing no signs of infection [Excellent Pain Control] : has excellent pain control [None] : None [de-identified] : Instructions and precautions reviewed, contraception discussed.  Patient to decide follow-up in 6 months

## 2023-03-24 ENCOUNTER — NON-APPOINTMENT (OUTPATIENT)
Age: 23
End: 2023-03-24

## 2023-06-14 ENCOUNTER — NON-APPOINTMENT (OUTPATIENT)
Age: 23
End: 2023-06-14

## 2023-06-14 ENCOUNTER — APPOINTMENT (OUTPATIENT)
Dept: INTERNAL MEDICINE | Facility: CLINIC | Age: 23
End: 2023-06-14
Payer: MEDICAID

## 2023-06-14 ENCOUNTER — LABORATORY RESULT (OUTPATIENT)
Age: 23
End: 2023-06-14

## 2023-06-14 VITALS
OXYGEN SATURATION: 97 % | WEIGHT: 139 LBS | HEIGHT: 64 IN | BODY MASS INDEX: 23.73 KG/M2 | HEART RATE: 68 BPM | TEMPERATURE: 97.9 F | DIASTOLIC BLOOD PRESSURE: 70 MMHG | RESPIRATION RATE: 17 BRPM | SYSTOLIC BLOOD PRESSURE: 122 MMHG

## 2023-06-14 DIAGNOSIS — Z83.3 FAMILY HISTORY OF DIABETES MELLITUS: ICD-10-CM

## 2023-06-14 DIAGNOSIS — Z3A.19 19 WEEKS GESTATION OF PREGNANCY: ICD-10-CM

## 2023-06-14 DIAGNOSIS — Z34.93 ENCOUNTER FOR SUPERVISION OF NORMAL PREGNANCY, UNSPECIFIED, THIRD TRIMESTER: ICD-10-CM

## 2023-06-14 DIAGNOSIS — R39.9 UNSPECIFIED SYMPTOMS AND SIGNS INVOLVING THE GENITOURINARY SYSTEM: ICD-10-CM

## 2023-06-14 DIAGNOSIS — Z87.42 PERSONAL HISTORY OF OTHER DISEASES OF THE FEMALE GENITAL TRACT: ICD-10-CM

## 2023-06-14 DIAGNOSIS — Z11.3 ENCOUNTER FOR SCREENING FOR INFECTIONS WITH A PREDOMINANTLY SEXUAL MODE OF TRANSMISSION: ICD-10-CM

## 2023-06-14 DIAGNOSIS — Z32.01 ENCOUNTER FOR PREGNANCY TEST, RESULT POSITIVE: ICD-10-CM

## 2023-06-14 DIAGNOSIS — R14.0 ABDOMINAL DISTENSION (GASEOUS): ICD-10-CM

## 2023-06-14 DIAGNOSIS — Z87.440 PERSONAL HISTORY OF URINARY (TRACT) INFECTIONS: ICD-10-CM

## 2023-06-14 DIAGNOSIS — Z34.91 ENCOUNTER FOR SUPERVISION OF NORMAL PREGNANCY, UNSPECIFIED, FIRST TRIMESTER: ICD-10-CM

## 2023-06-14 DIAGNOSIS — Z00.00 ENCOUNTER FOR GENERAL ADULT MEDICAL EXAMINATION W/OUT ABNORMAL FINDINGS: ICD-10-CM

## 2023-06-14 DIAGNOSIS — F17.210 NICOTINE DEPENDENCE, CIGARETTES, UNCOMPLICATED: ICD-10-CM

## 2023-06-14 DIAGNOSIS — Z3A.26 26 WEEKS GESTATION OF PREGNANCY: ICD-10-CM

## 2023-06-14 DIAGNOSIS — N94.9 UNSPECIFIED CONDITION ASSOCIATED WITH FEMALE GENITAL ORGANS AND MENSTRUAL CYCLE: ICD-10-CM

## 2023-06-14 DIAGNOSIS — B96.89 ACUTE VAGINITIS: ICD-10-CM

## 2023-06-14 DIAGNOSIS — N76.0 ACUTE VAGINITIS: ICD-10-CM

## 2023-06-14 PROCEDURE — 99385 PREV VISIT NEW AGE 18-39: CPT | Mod: 25

## 2023-06-14 RX ORDER — METRONIDAZOLE 7.5 MG/G
0.75 GEL VAGINAL
Qty: 1 | Refills: 0 | Status: DISCONTINUED | COMMUNITY
Start: 2021-09-22 | End: 2023-06-14

## 2023-06-14 RX ORDER — METRONIDAZOLE 7.5 MG/G
0.75 GEL VAGINAL
Qty: 1 | Refills: 0 | Status: DISCONTINUED | COMMUNITY
Start: 2021-11-10 | End: 2023-06-14

## 2023-06-14 RX ORDER — AMOXICILLIN 500 MG/1
500 TABLET, FILM COATED ORAL
Qty: 10 | Refills: 0 | Status: DISCONTINUED | COMMUNITY
Start: 2021-08-04 | End: 2023-06-14

## 2023-06-14 RX ORDER — FLUCONAZOLE 150 MG/1
150 TABLET ORAL DAILY
Qty: 2 | Refills: 0 | Status: DISCONTINUED | COMMUNITY
Start: 2021-09-23 | End: 2023-06-14

## 2023-06-14 RX ORDER — METRONIDAZOLE 7.5 MG/G
0.75 GEL VAGINAL
Qty: 1 | Refills: 0 | Status: DISCONTINUED | COMMUNITY
Start: 2021-08-04 | End: 2023-06-14

## 2023-06-14 RX ORDER — ASCORBIC ACID, BIOTIN, CHOLECALCIFEROL, CYANOCOBALAMIN, FOLIC ACID, FERROUS ASPARTO GLYCINATE, POTASSIUM IODIDE, PYRIDOXINE HYDROCHLORIDE, .ALPHA.-TOCOPHEROL ACETATE, DL-, DOCUSATE SODIUM AND BLUEBERRY 30; 75; 500; 13; 400; 10; 150; 5; 10; 200; 5; 600 MG/1; UG/1; [IU]/1; UG/1; UG/1; MG/1; UG/1; MG/1; [IU]/1; MG/1; MG/1; UG/1
10-0.6-0.4-2 TABLET, FILM COATED ORAL
Qty: 90 | Refills: 3 | Status: DISCONTINUED | COMMUNITY
Start: 2021-07-19 | End: 2023-06-14

## 2023-06-14 NOTE — REVIEW OF SYSTEMS
[Fever] : no fever [Chills] : no chills [Fatigue] : fatigue [Negative] : Heme/Lymph [FreeTextEntry7] : as noted in HPI

## 2023-06-14 NOTE — PHYSICAL EXAM
[No Acute Distress] : no acute distress [Well Nourished] : well nourished [Well Developed] : well developed [Well-Appearing] : well-appearing [Normal Sclera/Conjunctiva] : normal sclera/conjunctiva [PERRL] : pupils equal round and reactive to light [EOMI] : extraocular movements intact [Normal Outer Ear/Nose] : the outer ears and nose were normal in appearance [Normal Oropharynx] : the oropharynx was normal [Normal TMs] : both tympanic membranes were normal [No JVD] : no jugular venous distention [No Lymphadenopathy] : no lymphadenopathy [Supple] : supple [Thyroid Normal, No Nodules] : the thyroid was normal and there were no nodules present [No Respiratory Distress] : no respiratory distress  [No Accessory Muscle Use] : no accessory muscle use [Clear to Auscultation] : lungs were clear to auscultation bilaterally [Normal Rate] : normal rate  [Regular Rhythm] : with a regular rhythm [Normal S1, S2] : normal S1 and S2 [No Murmur] : no murmur heard [No Carotid Bruits] : no carotid bruits [No Varicosities] : no varicosities [Pedal Pulses Present] : the pedal pulses are present [No Edema] : there was no peripheral edema [No Extremity Clubbing/Cyanosis] : no extremity clubbing/cyanosis [Normal Appearance] : normal in appearance [No Nipple Discharge] : no nipple discharge [No Axillary Lymphadenopathy] : no axillary lymphadenopathy [Soft] : abdomen soft [Non Tender] : non-tender [Non-distended] : non-distended [No Masses] : no abdominal mass palpated [No HSM] : no HSM [Normal Bowel Sounds] : normal bowel sounds [Normal Posterior Cervical Nodes] : no posterior cervical lymphadenopathy [Normal Anterior Cervical Nodes] : no anterior cervical lymphadenopathy [No CVA Tenderness] : no CVA  tenderness [No Spinal Tenderness] : no spinal tenderness [No Joint Swelling] : no joint swelling [Grossly Normal Strength/Tone] : grossly normal strength/tone [No Rash] : no rash [Coordination Grossly Intact] : coordination grossly intact [No Focal Deficits] : no focal deficits [Normal Gait] : normal gait [Deep Tendon Reflexes (DTR)] : deep tendon reflexes were 2+ and symmetric [Normal Affect] : the affect was normal [Alert and Oriented x3] : oriented to person, place, and time [Normal Insight/Judgement] : insight and judgment were intact 2 = A lot of assistance

## 2023-06-14 NOTE — PLAN
[FreeTextEntry1] : Tiredness\par She should work on having a more nutritious diet.  She should avoid drinking sugar.\par She should get regular exercise.\par She should avoid sleeping during the daytime to make it easier to fall asleep at night\par \par Bloating and brain fog\par Will check for celiac disease\par \par Health maintenance\par Depression screening negative\par Check labs today, including CBC, CMP, TSH, HbA1c, lipids. HIV/STD testing. Blood collected in office. \par Pap smear up-to-date\par Follow-up 1 year or as needed

## 2023-06-14 NOTE — HISTORY OF PRESENT ILLNESS
[FreeTextEntry1] : Annual physical [de-identified] : 22 y/o female who is here for an annual physical/initial visit\par She feels bloated. She also c/o brain fog\par She doesn’t have energy but not tired at night. She goes to sleep around 4am. sleeps 4-5 hours and naps during the day.  The baby sleeps through the night\par no exercise\par diet is "not the best". Eats fast food. drinks soda.not much fruits and vegetables. drinks some water\par pap done last month\par

## 2023-06-14 NOTE — HEALTH RISK ASSESSMENT
[Patient reported PAP Smear was normal] : Patient reported PAP Smear was normal [With Family] : lives with family [Employed] : employed [Significant Other] : lives with significant other [# Of Children ___] : has [unfilled] children [HIV Test offered] : HIV Test offered [Reports changes in hearing] : Reports no changes in hearing [Reports changes in vision] : Reports no changes in vision [Reports changes in dental health] : Reports no changes in dental health [PapSmearDate] : 05/23 [FreeTextEntry2] :  [Current] : Current [0-4] : 0-4

## 2023-06-18 LAB
ALBUMIN SERPL ELPH-MCNC: 5.1 G/DL
ALP BLD-CCNC: 97 U/L
ALT SERPL-CCNC: 19 U/L
ANION GAP SERPL CALC-SCNC: 15 MMOL/L
APPEARANCE: CLEAR
AST SERPL-CCNC: 22 U/L
BACTERIA: NEGATIVE /HPF
BILIRUB SERPL-MCNC: 0.4 MG/DL
BILIRUBIN URINE: NEGATIVE
BLOOD URINE: NEGATIVE
BUN SERPL-MCNC: 14 MG/DL
C TRACH RRNA SPEC QL NAA+PROBE: NOT DETECTED
CALCIUM SERPL-MCNC: 10.1 MG/DL
CAST: 1 /LPF
CHLORIDE SERPL-SCNC: 100 MMOL/L
CHOLEST SERPL-MCNC: 232 MG/DL
CO2 SERPL-SCNC: 24 MMOL/L
COLOR: YELLOW
CREAT SERPL-MCNC: 0.79 MG/DL
EGFR: 107 ML/MIN/1.73M2
EPITHELIAL CELLS: 2 /HPF
ESTIMATED AVERAGE GLUCOSE: 108 MG/DL
FERRITIN SERPL-MCNC: 22 NG/ML
GLUCOSE QUALITATIVE U: NEGATIVE MG/DL
GLUCOSE SERPL-MCNC: 86 MG/DL
HBA1C MFR BLD HPLC: 5.4 %
HBV SURFACE AB SER QL: NONREACTIVE
HBV SURFACE AG SER QL: NONREACTIVE
HCV AB SER QL: NONREACTIVE
HCV S/CO RATIO: 0.11 S/CO
HDLC SERPL-MCNC: 65 MG/DL
HIV1+2 AB SPEC QL IA.RAPID: NONREACTIVE
IRON SATN MFR SERPL: 19 %
IRON SERPL-MCNC: 106 UG/DL
KETONES URINE: NEGATIVE MG/DL
LDLC SERPL CALC-MCNC: 145 MG/DL
LEUKOCYTE ESTERASE URINE: NEGATIVE
MICROSCOPIC-UA: NORMAL
N GONORRHOEA RRNA SPEC QL NAA+PROBE: NOT DETECTED
NITRITE URINE: NEGATIVE
NONHDLC SERPL-MCNC: 167 MG/DL
PH URINE: 6
POTASSIUM SERPL-SCNC: 4.6 MMOL/L
PROT SERPL-MCNC: 8 G/DL
PROTEIN URINE: NEGATIVE MG/DL
RED BLOOD CELLS URINE: 1 /HPF
SODIUM SERPL-SCNC: 139 MMOL/L
SOURCE AMPLIFICATION: NORMAL
SPECIFIC GRAVITY URINE: 1.02
T PALLIDUM AB SER QL IA: NEGATIVE
TIBC SERPL-MCNC: 553 UG/DL
TRIGL SERPL-MCNC: 109 MG/DL
TSH SERPL-ACNC: 1.23 UIU/ML
UIBC SERPL-MCNC: 447 UG/DL
UROBILINOGEN URINE: 0.2 MG/DL
VIT B12 SERPL-MCNC: 716 PG/ML
WHITE BLOOD CELLS URINE: 0 /HPF

## 2023-06-20 ENCOUNTER — NON-APPOINTMENT (OUTPATIENT)
Age: 23
End: 2023-06-20

## 2023-06-28 ENCOUNTER — TRANSCRIPTION ENCOUNTER (OUTPATIENT)
Age: 23
End: 2023-06-28

## 2023-06-28 LAB
CELIAC DISEASE INTERPRETATION: NORMAL
CELIAC GENE PAIRS PRESENT: YES
DQ ALPHA 1: NORMAL
DQ BETA 1: NORMAL
IMMUNOGLOBULIN A (IGA): 220 MG/DL

## 2024-04-22 NOTE — DISCHARGE NOTE OB - IF BREASTFEEDING, ADD A TOTAL OF 500 EXTRA CALORIES EACH DAY
We are already consulted on this patient. [facility] hospice has been referred and is following.    Statement Selected
